# Patient Record
Sex: FEMALE | Race: WHITE | NOT HISPANIC OR LATINO | URBAN - METROPOLITAN AREA
[De-identification: names, ages, dates, MRNs, and addresses within clinical notes are randomized per-mention and may not be internally consistent; named-entity substitution may affect disease eponyms.]

---

## 2017-03-22 ENCOUNTER — RECORDS - HEALTHEAST (OUTPATIENT)
Dept: LAB | Facility: CLINIC | Age: 77
End: 2017-03-22

## 2017-03-22 LAB
CHOLEST SERPL-MCNC: 151 MG/DL
FASTING STATUS PATIENT QL REPORTED: YES
HDLC SERPL-MCNC: 40 MG/DL
LDLC SERPL CALC-MCNC: 92 MG/DL
TRIGL SERPL-MCNC: 94 MG/DL

## 2018-04-17 ENCOUNTER — RECORDS - HEALTHEAST (OUTPATIENT)
Dept: LAB | Facility: CLINIC | Age: 78
End: 2018-04-17

## 2018-04-17 LAB
ALBUMIN SERPL-MCNC: 3.9 G/DL (ref 3.5–5)
ALP SERPL-CCNC: 82 U/L (ref 45–120)
ALT SERPL W P-5'-P-CCNC: 16 U/L (ref 0–45)
ANION GAP SERPL CALCULATED.3IONS-SCNC: 6 MMOL/L (ref 5–18)
AST SERPL W P-5'-P-CCNC: 20 U/L (ref 0–40)
BILIRUB SERPL-MCNC: 0.5 MG/DL (ref 0–1)
BUN SERPL-MCNC: 19 MG/DL (ref 8–28)
CALCIUM SERPL-MCNC: 9.3 MG/DL (ref 8.5–10.5)
CHLORIDE BLD-SCNC: 110 MMOL/L (ref 98–107)
CHOLEST SERPL-MCNC: 139 MG/DL
CO2 SERPL-SCNC: 29 MMOL/L (ref 22–31)
CREAT SERPL-MCNC: 0.78 MG/DL (ref 0.6–1.1)
FASTING STATUS PATIENT QL REPORTED: YES
GFR SERPL CREATININE-BSD FRML MDRD: >60 ML/MIN/1.73M2
GLUCOSE BLD-MCNC: 113 MG/DL (ref 70–125)
HDLC SERPL-MCNC: 42 MG/DL
LDLC SERPL CALC-MCNC: 87 MG/DL
POTASSIUM BLD-SCNC: 3.9 MMOL/L (ref 3.5–5)
PROT SERPL-MCNC: 6.6 G/DL (ref 6–8)
SODIUM SERPL-SCNC: 145 MMOL/L (ref 136–145)
TRIGL SERPL-MCNC: 52 MG/DL

## 2018-06-12 ENCOUNTER — RECORDS - HEALTHEAST (OUTPATIENT)
Dept: LAB | Facility: CLINIC | Age: 78
End: 2018-06-12

## 2018-06-12 LAB
BASOPHILS # BLD AUTO: 0 THOU/UL (ref 0–0.2)
BASOPHILS NFR BLD AUTO: 0 % (ref 0–2)
EOSINOPHIL COUNT (ABSOLUTE): 0 THOU/UL (ref 0–0.4)
EOSINOPHIL NFR BLD AUTO: 1 % (ref 0–6)
ERYTHROCYTE [DISTWIDTH] IN BLOOD BY AUTOMATED COUNT: 13.3 % (ref 11–14.5)
FOLATE SERPL-MCNC: 13.8 NG/ML
HCT VFR BLD AUTO: 31.6 % (ref 35–47)
HGB BLD-MCNC: 10.6 G/DL (ref 12–16)
IRON SATN MFR SERPL: 27 % (ref 20–50)
IRON SERPL-MCNC: 76 UG/DL (ref 42–175)
LYMPHOCYTES # BLD AUTO: 3.1 THOU/UL (ref 0.8–4.4)
LYMPHOCYTES NFR BLD AUTO: 65 % (ref 20–40)
MCH RBC QN AUTO: 31.3 PG (ref 27–34)
MCHC RBC AUTO-ENTMCNC: 33.5 G/DL (ref 32–36)
MCV RBC AUTO: 93 FL (ref 80–100)
MONOCYTES # BLD AUTO: 0.2 THOU/UL (ref 0–0.9)
MONOCYTES NFR BLD AUTO: 4 % (ref 2–10)
PLAT MORPH BLD: ABNORMAL
PLATELET # BLD AUTO: 90 THOU/UL (ref 140–440)
PMV BLD AUTO: 10.6 FL (ref 8.5–12.5)
RBC # BLD AUTO: 3.39 MILL/UL (ref 3.8–5.4)
TIBC SERPL-MCNC: 281 UG/DL (ref 313–563)
TOTAL NEUTROPHILS-ABS(DIFF): 1.4 THOU/UL (ref 2–7.7)
TOTAL NEUTROPHILS-REL(DIFF): 30 % (ref 50–70)
TRANSFERRIN SERPL-MCNC: 225 MG/DL (ref 212–360)
TSH SERPL DL<=0.005 MIU/L-ACNC: 1.42 UIU/ML (ref 0.3–5)
VIT B12 SERPL-MCNC: 280 PG/ML (ref 213–816)
WBC: 4.7 THOU/UL (ref 4–11)

## 2018-07-10 ENCOUNTER — RECORDS - HEALTHEAST (OUTPATIENT)
Dept: LAB | Facility: CLINIC | Age: 78
End: 2018-07-10

## 2018-07-11 LAB
BASOPHILS # BLD AUTO: 0 THOU/UL (ref 0–0.2)
BASOPHILS NFR BLD AUTO: 0 % (ref 0–2)
EOSINOPHIL COUNT (ABSOLUTE): 0.1 THOU/UL (ref 0–0.4)
EOSINOPHIL NFR BLD AUTO: 1 % (ref 0–6)
ERYTHROCYTE [DISTWIDTH] IN BLOOD BY AUTOMATED COUNT: 13.2 % (ref 11–14.5)
HCT VFR BLD AUTO: 32.3 % (ref 35–47)
HGB BLD-MCNC: 11.2 G/DL (ref 12–16)
LYMPHOCYTES # BLD AUTO: 3.8 THOU/UL (ref 0.8–4.4)
LYMPHOCYTES NFR BLD AUTO: 64 % (ref 20–40)
MCH RBC QN AUTO: 31.5 PG (ref 27–34)
MCHC RBC AUTO-ENTMCNC: 34.7 G/DL (ref 32–36)
MCV RBC AUTO: 91 FL (ref 80–100)
MONOCYTES # BLD AUTO: 0.2 THOU/UL (ref 0–0.9)
MONOCYTES NFR BLD AUTO: 3 % (ref 2–10)
OVALOCYTES: ABNORMAL
PLAT MORPH BLD: ABNORMAL
PLATELET # BLD AUTO: 92 THOU/UL (ref 140–440)
PMV BLD AUTO: 10.4 FL (ref 8.5–12.5)
RBC # BLD AUTO: 3.55 MILL/UL (ref 3.8–5.4)
REACTIVE LYMPHS: ABNORMAL
TOTAL NEUTROPHILS-ABS(DIFF): 1.9 THOU/UL (ref 2–7.7)
TOTAL NEUTROPHILS-REL(DIFF): 32 % (ref 50–70)
WBC: 6 THOU/UL (ref 4–11)

## 2018-10-29 ENCOUNTER — HOSPITAL ENCOUNTER (OUTPATIENT)
Dept: MAMMOGRAPHY | Facility: CLINIC | Age: 78
Discharge: HOME | End: 2018-10-29
Attending: FAMILY MEDICINE

## 2018-10-29 DIAGNOSIS — Z12.31 VISIT FOR SCREENING MAMMOGRAM: ICD-10-CM

## 2019-03-22 ENCOUNTER — RECORDS - HEALTHEAST (OUTPATIENT)
Dept: LAB | Facility: CLINIC | Age: 79
End: 2019-03-22

## 2019-03-22 LAB
CHOLEST SERPL-MCNC: 155 MG/DL
FASTING STATUS PATIENT QL REPORTED: ABNORMAL
HDLC SERPL-MCNC: 45 MG/DL
LDLC SERPL CALC-MCNC: 90 MG/DL
TRIGL SERPL-MCNC: 98 MG/DL

## 2019-10-08 ENCOUNTER — RECORDS - HEALTHEAST (OUTPATIENT)
Dept: LAB | Facility: CLINIC | Age: 79
End: 2019-10-08

## 2019-10-08 LAB
ALBUMIN SERPL-MCNC: 4.3 G/DL (ref 3.5–5)
ALP SERPL-CCNC: 79 U/L (ref 45–120)
ALT SERPL W P-5'-P-CCNC: 19 U/L (ref 0–45)
ANION GAP SERPL CALCULATED.3IONS-SCNC: 9 MMOL/L (ref 5–18)
AST SERPL W P-5'-P-CCNC: 22 U/L (ref 0–40)
BILIRUB SERPL-MCNC: 0.5 MG/DL (ref 0–1)
BUN SERPL-MCNC: 17 MG/DL (ref 8–28)
CALCIUM SERPL-MCNC: 9.4 MG/DL (ref 8.5–10.5)
CHLORIDE BLD-SCNC: 108 MMOL/L (ref 98–107)
CO2 SERPL-SCNC: 25 MMOL/L (ref 22–31)
CREAT SERPL-MCNC: 0.83 MG/DL (ref 0.6–1.1)
GFR SERPL CREATININE-BSD FRML MDRD: >60 ML/MIN/1.73M2
GLUCOSE BLD-MCNC: 95 MG/DL (ref 70–125)
POTASSIUM BLD-SCNC: 4 MMOL/L (ref 3.5–5)
PROT SERPL-MCNC: 6.5 G/DL (ref 6–8)
SODIUM SERPL-SCNC: 142 MMOL/L (ref 136–145)

## 2020-01-22 ENCOUNTER — HOSPITAL ENCOUNTER (OUTPATIENT)
Dept: MAMMOGRAPHY | Facility: CLINIC | Age: 80
Discharge: HOME | End: 2020-01-22
Attending: FAMILY MEDICINE

## 2020-01-22 DIAGNOSIS — Z12.31 VISIT FOR SCREENING MAMMOGRAM: ICD-10-CM

## 2020-08-25 ENCOUNTER — RECORDS - HEALTHEAST (OUTPATIENT)
Dept: LAB | Facility: CLINIC | Age: 80
End: 2020-08-25

## 2020-08-25 LAB
ALBUMIN SERPL-MCNC: 4.3 G/DL (ref 3.5–5)
ALP SERPL-CCNC: 86 U/L (ref 45–120)
ALT SERPL W P-5'-P-CCNC: 15 U/L (ref 0–45)
ANION GAP SERPL CALCULATED.3IONS-SCNC: 9 MMOL/L (ref 5–18)
AST SERPL W P-5'-P-CCNC: 18 U/L (ref 0–40)
BILIRUB SERPL-MCNC: 0.6 MG/DL (ref 0–1)
BUN SERPL-MCNC: 18 MG/DL (ref 8–28)
CALCIUM SERPL-MCNC: 9.4 MG/DL (ref 8.5–10.5)
CHLORIDE BLD-SCNC: 107 MMOL/L (ref 98–107)
CHOLEST SERPL-MCNC: 147 MG/DL
CO2 SERPL-SCNC: 26 MMOL/L (ref 22–31)
CREAT SERPL-MCNC: 0.92 MG/DL (ref 0.6–1.1)
FASTING STATUS PATIENT QL REPORTED: ABNORMAL
GFR SERPL CREATININE-BSD FRML MDRD: 59 ML/MIN/1.73M2
GLUCOSE BLD-MCNC: 98 MG/DL (ref 70–125)
HDLC SERPL-MCNC: 38 MG/DL
LDLC SERPL CALC-MCNC: 87 MG/DL
POTASSIUM BLD-SCNC: 4 MMOL/L (ref 3.5–5)
PROT SERPL-MCNC: 6.5 G/DL (ref 6–8)
SODIUM SERPL-SCNC: 142 MMOL/L (ref 136–145)
TRIGL SERPL-MCNC: 112 MG/DL
TSH SERPL DL<=0.005 MIU/L-ACNC: 1.95 UIU/ML (ref 0.3–5)
VIT B12 SERPL-MCNC: 347 PG/ML (ref 213–816)

## 2021-05-25 ENCOUNTER — RECORDS - HEALTHEAST (OUTPATIENT)
Dept: ADMINISTRATIVE | Facility: CLINIC | Age: 81
End: 2021-05-25

## 2021-05-26 ENCOUNTER — RECORDS - HEALTHEAST (OUTPATIENT)
Dept: ADMINISTRATIVE | Facility: CLINIC | Age: 81
End: 2021-05-26

## 2021-05-27 ENCOUNTER — RECORDS - HEALTHEAST (OUTPATIENT)
Dept: ADMINISTRATIVE | Facility: CLINIC | Age: 81
End: 2021-05-27

## 2021-05-28 ENCOUNTER — RECORDS - HEALTHEAST (OUTPATIENT)
Dept: ADMINISTRATIVE | Facility: CLINIC | Age: 81
End: 2021-05-28

## 2021-07-13 ENCOUNTER — RECORDS - HEALTHEAST (OUTPATIENT)
Dept: ADMINISTRATIVE | Facility: CLINIC | Age: 81
End: 2021-07-13

## 2021-07-21 ENCOUNTER — RECORDS - HEALTHEAST (OUTPATIENT)
Dept: ADMINISTRATIVE | Facility: CLINIC | Age: 81
End: 2021-07-21

## 2021-10-22 ENCOUNTER — LAB REQUISITION (OUTPATIENT)
Dept: LAB | Facility: CLINIC | Age: 81
End: 2021-10-22

## 2021-10-22 DIAGNOSIS — E78.5 HYPERLIPIDEMIA, UNSPECIFIED: ICD-10-CM

## 2021-10-22 LAB
ALBUMIN SERPL-MCNC: 4.4 G/DL (ref 3.5–5)
ALP SERPL-CCNC: 83 U/L (ref 45–120)
ALT SERPL W P-5'-P-CCNC: 13 U/L (ref 0–45)
ANION GAP SERPL CALCULATED.3IONS-SCNC: 9 MMOL/L (ref 5–18)
AST SERPL W P-5'-P-CCNC: 17 U/L (ref 0–40)
BILIRUB SERPL-MCNC: 0.5 MG/DL (ref 0–1)
BUN SERPL-MCNC: 16 MG/DL (ref 8–28)
CALCIUM SERPL-MCNC: 9.4 MG/DL (ref 8.5–10.5)
CHLORIDE BLD-SCNC: 110 MMOL/L (ref 98–107)
CHOLEST SERPL-MCNC: 147 MG/DL
CO2 SERPL-SCNC: 26 MMOL/L (ref 22–31)
CREAT SERPL-MCNC: 0.81 MG/DL (ref 0.6–1.1)
GFR SERPL CREATININE-BSD FRML MDRD: 69 ML/MIN/1.73M2
GLUCOSE BLD-MCNC: 103 MG/DL (ref 70–125)
HDLC SERPL-MCNC: 46 MG/DL
LDLC SERPL CALC-MCNC: 86 MG/DL
POTASSIUM BLD-SCNC: 3.9 MMOL/L (ref 3.5–5)
PROT SERPL-MCNC: 6.6 G/DL (ref 6–8)
SODIUM SERPL-SCNC: 145 MMOL/L (ref 136–145)
TRIGL SERPL-MCNC: 77 MG/DL

## 2021-10-22 PROCEDURE — 80053 COMPREHEN METABOLIC PANEL: CPT | Performed by: FAMILY MEDICINE

## 2021-10-22 PROCEDURE — 80061 LIPID PANEL: CPT | Performed by: FAMILY MEDICINE

## 2022-09-08 ENCOUNTER — LAB REQUISITION (OUTPATIENT)
Dept: LAB | Facility: CLINIC | Age: 82
End: 2022-09-08

## 2022-09-08 DIAGNOSIS — E11.9 TYPE 2 DIABETES MELLITUS WITHOUT COMPLICATIONS (H): ICD-10-CM

## 2022-09-08 DIAGNOSIS — D64.9 ANEMIA, UNSPECIFIED: ICD-10-CM

## 2022-09-08 LAB
ERYTHROCYTE [DISTWIDTH] IN BLOOD BY AUTOMATED COUNT: 13.8 % (ref 10–15)
HCT VFR BLD AUTO: 29.6 % (ref 35–47)
HGB BLD-MCNC: 9.9 G/DL (ref 11.7–15.7)
MCH RBC QN AUTO: 31.1 PG (ref 26.5–33)
MCHC RBC AUTO-ENTMCNC: 33.4 G/DL (ref 31.5–36.5)
MCV RBC AUTO: 93 FL (ref 78–100)
PLATELET # BLD AUTO: 81 10E3/UL (ref 150–450)
RBC # BLD AUTO: 3.18 10E6/UL (ref 3.8–5.2)
WBC # BLD AUTO: 5.7 10E3/UL (ref 4–11)

## 2022-09-08 PROCEDURE — 80053 COMPREHEN METABOLIC PANEL: CPT | Performed by: FAMILY MEDICINE

## 2022-09-08 PROCEDURE — 82040 ASSAY OF SERUM ALBUMIN: CPT | Performed by: FAMILY MEDICINE

## 2022-09-08 PROCEDURE — 85027 COMPLETE CBC AUTOMATED: CPT | Performed by: FAMILY MEDICINE

## 2022-09-08 PROCEDURE — 83036 HEMOGLOBIN GLYCOSYLATED A1C: CPT | Performed by: FAMILY MEDICINE

## 2022-09-08 PROCEDURE — 80061 LIPID PANEL: CPT | Performed by: FAMILY MEDICINE

## 2022-09-09 LAB
ALBUMIN SERPL BCG-MCNC: 4.5 G/DL (ref 3.5–5.2)
ALP SERPL-CCNC: 72 U/L (ref 35–104)
ALT SERPL W P-5'-P-CCNC: 18 U/L (ref 10–35)
ANION GAP SERPL CALCULATED.3IONS-SCNC: 10 MMOL/L (ref 7–15)
AST SERPL W P-5'-P-CCNC: 21 U/L (ref 10–35)
BILIRUB SERPL-MCNC: 0.4 MG/DL
BUN SERPL-MCNC: 19.6 MG/DL (ref 8–23)
CALCIUM SERPL-MCNC: 9.3 MG/DL (ref 8.8–10.2)
CHLORIDE SERPL-SCNC: 109 MMOL/L (ref 98–107)
CHOLEST SERPL-MCNC: 137 MG/DL
CREAT SERPL-MCNC: 0.96 MG/DL (ref 0.51–0.95)
DEPRECATED HCO3 PLAS-SCNC: 24 MMOL/L (ref 22–29)
GFR SERPL CREATININE-BSD FRML MDRD: 60 ML/MIN/1.73M2
GLUCOSE SERPL-MCNC: 89 MG/DL (ref 70–99)
HBA1C MFR BLD: 5.4 %
HDLC SERPL-MCNC: 42 MG/DL
LDLC SERPL CALC-MCNC: 74 MG/DL
NONHDLC SERPL-MCNC: 95 MG/DL
POTASSIUM SERPL-SCNC: 3.9 MMOL/L (ref 3.4–5.3)
PROT SERPL-MCNC: 6.3 G/DL (ref 6.4–8.3)
SODIUM SERPL-SCNC: 143 MMOL/L (ref 136–145)
TRIGL SERPL-MCNC: 107 MG/DL

## 2023-01-06 ENCOUNTER — LAB REQUISITION (OUTPATIENT)
Dept: LAB | Facility: CLINIC | Age: 83
End: 2023-01-06

## 2023-01-06 DIAGNOSIS — R30.0 DYSURIA: ICD-10-CM

## 2023-01-06 PROCEDURE — 87086 URINE CULTURE/COLONY COUNT: CPT | Performed by: FAMILY MEDICINE

## 2023-01-08 LAB — BACTERIA UR CULT: NORMAL

## 2023-04-20 ENCOUNTER — LAB REQUISITION (OUTPATIENT)
Dept: LAB | Facility: CLINIC | Age: 83
End: 2023-04-20

## 2023-04-20 DIAGNOSIS — Z00.00 ENCOUNTER FOR GENERAL ADULT MEDICAL EXAMINATION WITHOUT ABNORMAL FINDINGS: ICD-10-CM

## 2023-04-20 LAB
ALBUMIN SERPL BCG-MCNC: 4.6 G/DL (ref 3.5–5.2)
ALP SERPL-CCNC: 73 U/L (ref 35–104)
ALT SERPL W P-5'-P-CCNC: 20 U/L (ref 10–35)
ANION GAP SERPL CALCULATED.3IONS-SCNC: 11 MMOL/L (ref 7–15)
AST SERPL W P-5'-P-CCNC: 24 U/L (ref 10–35)
BILIRUB SERPL-MCNC: 0.5 MG/DL
BUN SERPL-MCNC: 16.9 MG/DL (ref 8–23)
CALCIUM SERPL-MCNC: 9.5 MG/DL (ref 8.8–10.2)
CHLORIDE SERPL-SCNC: 103 MMOL/L (ref 98–107)
CREAT SERPL-MCNC: 0.98 MG/DL (ref 0.51–0.95)
DEPRECATED HCO3 PLAS-SCNC: 23 MMOL/L (ref 22–29)
GFR SERPL CREATININE-BSD FRML MDRD: 58 ML/MIN/1.73M2
GLUCOSE SERPL-MCNC: 101 MG/DL (ref 70–99)
POTASSIUM SERPL-SCNC: 4.3 MMOL/L (ref 3.4–5.3)
PROT SERPL-MCNC: 6.4 G/DL (ref 6.4–8.3)
SODIUM SERPL-SCNC: 137 MMOL/L (ref 136–145)

## 2023-04-20 PROCEDURE — 80053 COMPREHEN METABOLIC PANEL: CPT | Performed by: PHYSICIAN ASSISTANT

## 2023-07-28 ENCOUNTER — LAB REQUISITION (OUTPATIENT)
Dept: LAB | Facility: CLINIC | Age: 83
End: 2023-07-28

## 2023-07-28 DIAGNOSIS — R30.0 DYSURIA: ICD-10-CM

## 2023-07-28 PROCEDURE — 87086 URINE CULTURE/COLONY COUNT: CPT | Performed by: PHYSICIAN ASSISTANT

## 2023-07-30 LAB — BACTERIA UR CULT: NORMAL

## 2024-01-18 ENCOUNTER — LAB REQUISITION (OUTPATIENT)
Dept: LAB | Facility: CLINIC | Age: 84
End: 2024-01-18

## 2024-01-18 DIAGNOSIS — E78.5 HYPERLIPIDEMIA, UNSPECIFIED: ICD-10-CM

## 2024-01-18 DIAGNOSIS — E11.9 TYPE 2 DIABETES MELLITUS WITHOUT COMPLICATIONS (H): ICD-10-CM

## 2024-01-18 PROCEDURE — 80053 COMPREHEN METABOLIC PANEL: CPT | Performed by: PHYSICIAN ASSISTANT

## 2024-01-18 PROCEDURE — 80061 LIPID PANEL: CPT | Performed by: PHYSICIAN ASSISTANT

## 2024-01-19 LAB
ALBUMIN SERPL BCG-MCNC: 4.1 G/DL (ref 3.5–5.2)
ALP SERPL-CCNC: 80 U/L (ref 40–150)
ALT SERPL W P-5'-P-CCNC: 20 U/L (ref 0–50)
ANION GAP SERPL CALCULATED.3IONS-SCNC: 12 MMOL/L (ref 7–15)
AST SERPL W P-5'-P-CCNC: 28 U/L (ref 0–45)
BILIRUB SERPL-MCNC: 0.4 MG/DL
BUN SERPL-MCNC: 19.3 MG/DL (ref 8–23)
CALCIUM SERPL-MCNC: 9.3 MG/DL (ref 8.8–10.2)
CHLORIDE SERPL-SCNC: 105 MMOL/L (ref 98–107)
CHOLEST SERPL-MCNC: 138 MG/DL
CREAT SERPL-MCNC: 1.1 MG/DL (ref 0.51–0.95)
DEPRECATED HCO3 PLAS-SCNC: 25 MMOL/L (ref 22–29)
EGFRCR SERPLBLD CKD-EPI 2021: 50 ML/MIN/1.73M2
FASTING STATUS PATIENT QL REPORTED: ABNORMAL
GLUCOSE SERPL-MCNC: 91 MG/DL (ref 70–99)
HDLC SERPL-MCNC: 43 MG/DL
LDLC SERPL CALC-MCNC: 70 MG/DL
NONHDLC SERPL-MCNC: 95 MG/DL
POTASSIUM SERPL-SCNC: 4.3 MMOL/L (ref 3.4–5.3)
PROT SERPL-MCNC: 6.8 G/DL (ref 6.4–8.3)
SODIUM SERPL-SCNC: 142 MMOL/L (ref 135–145)
TRIGL SERPL-MCNC: 125 MG/DL

## 2024-11-01 ENCOUNTER — LAB REQUISITION (OUTPATIENT)
Dept: LAB | Facility: CLINIC | Age: 84
End: 2024-11-01

## 2024-11-01 DIAGNOSIS — E11.22 TYPE 2 DIABETES MELLITUS WITH DIABETIC CHRONIC KIDNEY DISEASE (H): ICD-10-CM

## 2024-11-01 PROCEDURE — 82043 UR ALBUMIN QUANTITATIVE: CPT | Performed by: PHYSICIAN ASSISTANT

## 2024-11-01 PROCEDURE — 82465 ASSAY BLD/SERUM CHOLESTEROL: CPT | Performed by: PHYSICIAN ASSISTANT

## 2024-11-01 PROCEDURE — 82310 ASSAY OF CALCIUM: CPT | Performed by: PHYSICIAN ASSISTANT

## 2024-11-02 LAB
ALBUMIN SERPL BCG-MCNC: 4.2 G/DL (ref 3.5–5.2)
ALP SERPL-CCNC: 79 U/L (ref 40–150)
ALT SERPL W P-5'-P-CCNC: 41 U/L (ref 0–50)
ANION GAP SERPL CALCULATED.3IONS-SCNC: 10 MMOL/L (ref 7–15)
AST SERPL W P-5'-P-CCNC: 34 U/L (ref 0–45)
BILIRUB SERPL-MCNC: 0.9 MG/DL
BUN SERPL-MCNC: 15.7 MG/DL (ref 8–23)
CALCIUM SERPL-MCNC: 9.2 MG/DL (ref 8.8–10.4)
CHLORIDE SERPL-SCNC: 104 MMOL/L (ref 98–107)
CHOLEST SERPL-MCNC: 124 MG/DL
CREAT SERPL-MCNC: 0.97 MG/DL (ref 0.51–0.95)
CREAT UR-MCNC: 280 MG/DL
EGFRCR SERPLBLD CKD-EPI 2021: 58 ML/MIN/1.73M2
FASTING STATUS PATIENT QL REPORTED: ABNORMAL
FASTING STATUS PATIENT QL REPORTED: NORMAL
GLUCOSE SERPL-MCNC: 123 MG/DL (ref 70–99)
HCO3 SERPL-SCNC: 27 MMOL/L (ref 22–29)
HDLC SERPL-MCNC: 50 MG/DL
LDLC SERPL CALC-MCNC: 58 MG/DL
MICROALBUMIN UR-MCNC: 124 MG/L
MICROALBUMIN/CREAT UR: 44.29 MG/G CR (ref 0–25)
NONHDLC SERPL-MCNC: 74 MG/DL
POTASSIUM SERPL-SCNC: 3.7 MMOL/L (ref 3.4–5.3)
PROT SERPL-MCNC: 6.3 G/DL (ref 6.4–8.3)
SODIUM SERPL-SCNC: 141 MMOL/L (ref 135–145)
TRIGL SERPL-MCNC: 80 MG/DL

## 2025-01-19 ENCOUNTER — APPOINTMENT (OUTPATIENT)
Dept: RADIOLOGY | Facility: HOSPITAL | Age: 85
End: 2025-01-19
Attending: STUDENT IN AN ORGANIZED HEALTH CARE EDUCATION/TRAINING PROGRAM
Payer: COMMERCIAL

## 2025-01-19 ENCOUNTER — APPOINTMENT (OUTPATIENT)
Dept: CT IMAGING | Facility: HOSPITAL | Age: 85
End: 2025-01-19
Attending: INTERNAL MEDICINE
Payer: COMMERCIAL

## 2025-01-19 ENCOUNTER — HOSPITAL ENCOUNTER (OUTPATIENT)
Facility: HOSPITAL | Age: 85
Setting detail: OBSERVATION
Discharge: NURSING, REHAB OR LONG-TERM CARE | End: 2025-01-20
Attending: STUDENT IN AN ORGANIZED HEALTH CARE EDUCATION/TRAINING PROGRAM | Admitting: STUDENT IN AN ORGANIZED HEALTH CARE EDUCATION/TRAINING PROGRAM
Payer: COMMERCIAL

## 2025-01-19 ENCOUNTER — APPOINTMENT (OUTPATIENT)
Dept: PHYSICAL THERAPY | Facility: HOSPITAL | Age: 85
End: 2025-01-19
Attending: INTERNAL MEDICINE
Payer: COMMERCIAL

## 2025-01-19 ENCOUNTER — APPOINTMENT (OUTPATIENT)
Dept: CT IMAGING | Facility: HOSPITAL | Age: 85
End: 2025-01-19
Attending: STUDENT IN AN ORGANIZED HEALTH CARE EDUCATION/TRAINING PROGRAM
Payer: COMMERCIAL
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
## 2025-01-19 DIAGNOSIS — K59.03 DRUG-INDUCED CONSTIPATION: Primary | ICD-10-CM
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
## 2025-01-19 DIAGNOSIS — S42.201A CLOSED FRACTURE OF PROXIMAL END OF RIGHT HUMERUS, UNSPECIFIED FRACTURE MORPHOLOGY, INITIAL ENCOUNTER: ICD-10-CM

## 2025-01-19 DIAGNOSIS — E04.1 THYROID NODULE: ICD-10-CM

## 2025-01-19 LAB
ANION GAP SERPL CALCULATED.3IONS-SCNC: 13 MMOL/L (ref 7–15)
BASOPHILS # BLD AUTO: 0 10E3/UL (ref 0–0.2)
BASOPHILS NFR BLD AUTO: 0 %
BUN SERPL-MCNC: 19.2 MG/DL (ref 8–23)
CALCIUM SERPL-MCNC: 9.7 MG/DL (ref 8.8–10.4)
CHLORIDE SERPL-SCNC: 104 MMOL/L (ref 98–107)
CK SERPL-CCNC: 89 U/L (ref 26–192)
CREAT SERPL-MCNC: 0.78 MG/DL (ref 0.51–0.95)
EGFRCR SERPLBLD CKD-EPI 2021: 75 ML/MIN/1.73M2
EOSINOPHIL # BLD AUTO: 0 10E3/UL (ref 0–0.7)
EOSINOPHIL NFR BLD AUTO: 0 %
ERYTHROCYTE [DISTWIDTH] IN BLOOD BY AUTOMATED COUNT: 12.5 % (ref 10–15)
GLUCOSE SERPL-MCNC: 154 MG/DL (ref 70–99)
HCO3 SERPL-SCNC: 25 MMOL/L (ref 22–29)
HCT VFR BLD AUTO: 29.9 % (ref 35–47)
HGB BLD-MCNC: 10.1 G/DL (ref 11.7–15.7)
IMM GRANULOCYTES # BLD: 0 10E3/UL
IMM GRANULOCYTES NFR BLD: 0 %
LYMPHOCYTES # BLD AUTO: 2.9 10E3/UL (ref 0.8–5.3)
LYMPHOCYTES NFR BLD AUTO: 33 %
MCH RBC QN AUTO: 32.2 PG (ref 26.5–33)
MCHC RBC AUTO-ENTMCNC: 33.8 G/DL (ref 31.5–36.5)
MCV RBC AUTO: 95 FL (ref 78–100)
MONOCYTES # BLD AUTO: 0.6 10E3/UL (ref 0–1.3)
MONOCYTES NFR BLD AUTO: 7 %
NEUTROPHILS # BLD AUTO: 5.1 10E3/UL (ref 1.6–8.3)
NEUTROPHILS NFR BLD AUTO: 59 %
NRBC # BLD AUTO: 0 10E3/UL
NRBC BLD AUTO-RTO: 0 /100
PLATELET # BLD AUTO: 82 10E3/UL (ref 150–450)
POTASSIUM SERPL-SCNC: 3.5 MMOL/L (ref 3.4–5.3)
RBC # BLD AUTO: 3.14 10E6/UL (ref 3.8–5.2)
SODIUM SERPL-SCNC: 142 MMOL/L (ref 135–145)
WBC # BLD AUTO: 8.7 10E3/UL (ref 4–11)

## 2025-01-19 PROCEDURE — 70450 CT HEAD/BRAIN W/O DYE: CPT

## 2025-01-19 PROCEDURE — 82550 ASSAY OF CK (CPK): CPT | Performed by: INTERNAL MEDICINE

## 2025-01-19 PROCEDURE — 120N000001 HC R&B MED SURG/OB

## 2025-01-19 PROCEDURE — 250N000013 HC RX MED GY IP 250 OP 250 PS 637: Performed by: INTERNAL MEDICINE

## 2025-01-19 PROCEDURE — 82565 ASSAY OF CREATININE: CPT | Performed by: STUDENT IN AN ORGANIZED HEALTH CARE EDUCATION/TRAINING PROGRAM

## 2025-01-19 PROCEDURE — 85041 AUTOMATED RBC COUNT: CPT | Performed by: STUDENT IN AN ORGANIZED HEALTH CARE EDUCATION/TRAINING PROGRAM

## 2025-01-19 PROCEDURE — 97116 GAIT TRAINING THERAPY: CPT | Mod: GP

## 2025-01-19 PROCEDURE — 36415 COLL VENOUS BLD VENIPUNCTURE: CPT | Performed by: STUDENT IN AN ORGANIZED HEALTH CARE EDUCATION/TRAINING PROGRAM

## 2025-01-19 PROCEDURE — 97161 PT EVAL LOW COMPLEX 20 MIN: CPT | Mod: GP

## 2025-01-19 PROCEDURE — 85004 AUTOMATED DIFF WBC COUNT: CPT | Performed by: STUDENT IN AN ORGANIZED HEALTH CARE EDUCATION/TRAINING PROGRAM

## 2025-01-19 PROCEDURE — 73200 CT UPPER EXTREMITY W/O DYE: CPT | Mod: RT

## 2025-01-19 PROCEDURE — 99223 1ST HOSP IP/OBS HIGH 75: CPT | Performed by: INTERNAL MEDICINE

## 2025-01-19 PROCEDURE — 80048 BASIC METABOLIC PNL TOTAL CA: CPT | Performed by: STUDENT IN AN ORGANIZED HEALTH CARE EDUCATION/TRAINING PROGRAM

## 2025-01-19 PROCEDURE — 73030 X-RAY EXAM OF SHOULDER: CPT | Mod: RT

## 2025-01-19 PROCEDURE — 72125 CT NECK SPINE W/O DYE: CPT

## 2025-01-19 PROCEDURE — 99285 EMERGENCY DEPT VISIT HI MDM: CPT | Mod: 25

## 2025-01-19 PROCEDURE — 250N000013 HC RX MED GY IP 250 OP 250 PS 637: Performed by: STUDENT IN AN ORGANIZED HEALTH CARE EDUCATION/TRAINING PROGRAM

## 2025-01-19 PROCEDURE — 73060 X-RAY EXAM OF HUMERUS: CPT | Mod: RT

## 2025-01-19 RX ORDER — TOLTERODINE 2 MG/1
4 CAPSULE, EXTENDED RELEASE ORAL DAILY
Status: DISCONTINUED | OUTPATIENT
Start: 2025-01-19 | End: 2025-01-20 | Stop reason: HOSPADM

## 2025-01-19 RX ORDER — DONEPEZIL HYDROCHLORIDE 23 MG/1
23 TABLET, FILM COATED ORAL DAILY
Status: DISCONTINUED | OUTPATIENT
Start: 2025-01-19 | End: 2025-01-19

## 2025-01-19 RX ORDER — DONEPEZIL HYDROCHLORIDE 23 MG/1
23 TABLET, FILM COATED ORAL AT BEDTIME
Status: DISCONTINUED | OUTPATIENT
Start: 2025-01-19 | End: 2025-01-20 | Stop reason: HOSPADM

## 2025-01-19 RX ORDER — ACETAMINOPHEN 325 MG/1
650 TABLET ORAL EVERY 4 HOURS PRN
Status: DISCONTINUED | OUTPATIENT
Start: 2025-01-19 | End: 2025-01-20 | Stop reason: HOSPADM

## 2025-01-19 RX ORDER — MULTIPLE VITAMINS W/ MINERALS TAB 9MG-400MCG
1 TAB ORAL DAILY
COMMUNITY

## 2025-01-19 RX ORDER — ATORVASTATIN CALCIUM 20 MG/1
20 TABLET, FILM COATED ORAL EVERY EVENING
COMMUNITY

## 2025-01-19 RX ORDER — ONDANSETRON 4 MG/1
4 TABLET, ORALLY DISINTEGRATING ORAL EVERY 6 HOURS PRN
Status: DISCONTINUED | OUTPATIENT
Start: 2025-01-19 | End: 2025-01-20 | Stop reason: HOSPADM

## 2025-01-19 RX ORDER — ATORVASTATIN CALCIUM 10 MG/1
20 TABLET, FILM COATED ORAL EVERY EVENING
Status: DISCONTINUED | OUTPATIENT
Start: 2025-01-19 | End: 2025-01-20 | Stop reason: HOSPADM

## 2025-01-19 RX ORDER — NALOXONE HYDROCHLORIDE 0.4 MG/ML
0.4 INJECTION, SOLUTION INTRAMUSCULAR; INTRAVENOUS; SUBCUTANEOUS
Status: DISCONTINUED | OUTPATIENT
Start: 2025-01-19 | End: 2025-01-20 | Stop reason: HOSPADM

## 2025-01-19 RX ORDER — ESTRADIOL 0.1 MG/G
2 CREAM VAGINAL
Status: DISCONTINUED | OUTPATIENT
Start: 2025-01-20 | End: 2025-01-20 | Stop reason: HOSPADM

## 2025-01-19 RX ORDER — NALOXONE HYDROCHLORIDE 0.4 MG/ML
0.2 INJECTION, SOLUTION INTRAMUSCULAR; INTRAVENOUS; SUBCUTANEOUS
Status: DISCONTINUED | OUTPATIENT
Start: 2025-01-19 | End: 2025-01-20 | Stop reason: HOSPADM

## 2025-01-19 RX ORDER — TOLTERODINE 4 MG/1
4 CAPSULE, EXTENDED RELEASE ORAL DAILY
COMMUNITY

## 2025-01-19 RX ORDER — LIDOCAINE 40 MG/G
CREAM TOPICAL
Status: DISCONTINUED | OUTPATIENT
Start: 2025-01-19 | End: 2025-01-20 | Stop reason: HOSPADM

## 2025-01-19 RX ORDER — ACETAMINOPHEN 650 MG/1
650 SUPPOSITORY RECTAL EVERY 4 HOURS PRN
Status: DISCONTINUED | OUTPATIENT
Start: 2025-01-19 | End: 2025-01-20 | Stop reason: HOSPADM

## 2025-01-19 RX ORDER — LORAZEPAM 0.5 MG/1
0.5 TABLET ORAL
COMMUNITY
End: 2025-01-22

## 2025-01-19 RX ORDER — AMOXICILLIN 250 MG
2 CAPSULE ORAL 2 TIMES DAILY PRN
Status: DISCONTINUED | OUTPATIENT
Start: 2025-01-19 | End: 2025-01-20 | Stop reason: HOSPADM

## 2025-01-19 RX ORDER — DONEPEZIL HYDROCHLORIDE 23 MG/1
1 TABLET, FILM COATED ORAL AT BEDTIME
COMMUNITY

## 2025-01-19 RX ORDER — ONDANSETRON 2 MG/ML
4 INJECTION INTRAMUSCULAR; INTRAVENOUS EVERY 6 HOURS PRN
Status: DISCONTINUED | OUTPATIENT
Start: 2025-01-19 | End: 2025-01-20 | Stop reason: HOSPADM

## 2025-01-19 RX ORDER — AMOXICILLIN 250 MG
1 CAPSULE ORAL 2 TIMES DAILY PRN
Status: DISCONTINUED | OUTPATIENT
Start: 2025-01-19 | End: 2025-01-20 | Stop reason: HOSPADM

## 2025-01-19 RX ORDER — LORATADINE 10 MG/1
10 TABLET ORAL DAILY
Status: DISCONTINUED | OUTPATIENT
Start: 2025-01-19 | End: 2025-01-20 | Stop reason: HOSPADM

## 2025-01-19 RX ORDER — ACETAMINOPHEN 325 MG/1
975 TABLET ORAL ONCE
Status: COMPLETED | OUTPATIENT
Start: 2025-01-19 | End: 2025-01-19

## 2025-01-19 RX ORDER — ESTRADIOL 0.1 MG/G
CREAM VAGINAL
COMMUNITY

## 2025-01-19 RX ORDER — IPRATROPIUM BROMIDE 21 UG/1
2 SPRAY, METERED NASAL 3 TIMES DAILY
COMMUNITY
Start: 2024-11-29

## 2025-01-19 RX ORDER — FLUOXETINE 40 MG/1
40 CAPSULE ORAL DAILY
COMMUNITY
Start: 2024-12-12

## 2025-01-19 RX ORDER — IPRATROPIUM BROMIDE 21 UG/1
2 SPRAY, METERED NASAL 3 TIMES DAILY
Status: DISCONTINUED | OUTPATIENT
Start: 2025-01-19 | End: 2025-01-20 | Stop reason: HOSPADM

## 2025-01-19 RX ORDER — CARBOXYMETHYLCELLULOSE SODIUM 5 MG/ML
1 SOLUTION/ DROPS OPHTHALMIC 4 TIMES DAILY PRN
Status: DISCONTINUED | OUTPATIENT
Start: 2025-01-19 | End: 2025-01-20 | Stop reason: HOSPADM

## 2025-01-19 RX ORDER — CALCIUM CARBONATE 500 MG/1
1000 TABLET, CHEWABLE ORAL 4 TIMES DAILY PRN
Status: DISCONTINUED | OUTPATIENT
Start: 2025-01-19 | End: 2025-01-20 | Stop reason: HOSPADM

## 2025-01-19 RX ORDER — IBUPROFEN 600 MG/1
600 TABLET, FILM COATED ORAL ONCE
Status: COMPLETED | OUTPATIENT
Start: 2025-01-19 | End: 2025-01-19

## 2025-01-19 RX ORDER — LORAZEPAM 0.5 MG/1
0.5 TABLET ORAL
Status: DISCONTINUED | OUTPATIENT
Start: 2025-01-19 | End: 2025-01-20 | Stop reason: HOSPADM

## 2025-01-19 RX ADMIN — DONEPEZIL HYDROCHLORIDE 23 MG: 23 TABLET, FILM COATED ORAL at 20:14

## 2025-01-19 RX ADMIN — TOLTERODINE 4 MG: 2 CAPSULE, EXTENDED RELEASE ORAL at 11:08

## 2025-01-19 RX ADMIN — FLUOXETINE HYDROCHLORIDE 40 MG: 20 CAPSULE ORAL at 11:07

## 2025-01-19 RX ADMIN — ATORVASTATIN CALCIUM 20 MG: 10 TABLET, FILM COATED ORAL at 20:13

## 2025-01-19 RX ADMIN — IPRATROPIUM BROMIDE 2 SPRAY: 21 SPRAY, METERED NASAL at 20:30

## 2025-01-19 RX ADMIN — OXYCODONE HYDROCHLORIDE 2.5 MG: 5 TABLET ORAL at 20:13

## 2025-01-19 RX ADMIN — IBUPROFEN 600 MG: 600 TABLET, FILM COATED ORAL at 05:04

## 2025-01-19 RX ADMIN — ACETAMINOPHEN 975 MG: 325 TABLET ORAL at 05:04

## 2025-01-19 ASSESSMENT — ACTIVITIES OF DAILY LIVING (ADL)
ADLS_ACUITY_SCORE: 41
DEPENDENT_IADLS:: CLEANING;LAUNDRY;SHOPPING;MONEY MANAGEMENT;TRANSPORTATION
ADLS_ACUITY_SCORE: 41

## 2025-01-19 ASSESSMENT — COLUMBIA-SUICIDE SEVERITY RATING SCALE - C-SSRS
6. HAVE YOU EVER DONE ANYTHING, STARTED TO DO ANYTHING, OR PREPARED TO DO ANYTHING TO END YOUR LIFE?: NO
1. IN THE PAST MONTH, HAVE YOU WISHED YOU WERE DEAD OR WISHED YOU COULD GO TO SLEEP AND NOT WAKE UP?: NO
2. HAVE YOU ACTUALLY HAD ANY THOUGHTS OF KILLING YOURSELF IN THE PAST MONTH?: NO

## 2025-01-19 NOTE — PLAN OF CARE
Goal Outcome Evaluation:      Plan of Care Reviewed With: patient, child          Outcome Evaluation: Likely will need TCU.

## 2025-01-19 NOTE — ED NOTES
Bed: JNED-05  Expected date: 1/19/25  Expected time: 4:11 AM  Means of arrival: Ambulance  Comments:  WBL- 84F fall, right shoulder pain, vss

## 2025-01-19 NOTE — H&P
[facility]    History and Physical - Hospitalist Service         Date of Admission:  2025  Bushra Mccoy is a 84 year old female with a pertinent history of Alzheimer's disease and overactive urinary bladder, who presents to this ED via ambulance for evaluation of right shoulder injury following a fall.       Assessment & Plan    Mechanical fall this morning, hit right side of shoulder/neck  Unsure if she had LOC, no chest pain prior, no neuro weakness  Check CT head and C-spine today-placed on cardiac monitoring, check EKG today  PT OT evaluation  Falls precaution  Analgesia started    Right humerus neck fracture  No neurovascular deficits on exam   Xrays show an apparent subtle linear lucency within the cortex of the lateral aspect of the right humeral neck and apparent foreshortening of the humeral neck,   CT shows impacted fracture involving the surgical neck of the proximal right humerus.   Orthopedics consult placed  N.p.o. 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    Full code per discussion patient at bedside  She is quite alert and oriented currently    Ambulatory prior to this admission, recently moved to independent living facility,   recently  Quite active exercise tolerance prior to this episode, denies any cardiac renal disease.    Diet:  Diet if okay by orthopedic    DVT Prophylaxis: Low Risk/Ambulatory with no VTE prophylaxis  indicated  Soliz Catheter: Not present  Lines: None     Cardiac Monitoring: None  Code Status:  Above    Clinically Significant Risk Factors Present on Admission                 # Thrombocytopenia: Lowest platelets = 82 in last 2 days, will monitor for bleeding        # Anemia: based on hgb <11                  Disposition Plan     Medically Ready for Discharge: Anticipated in 2-4 Days           Jayesh Marx MD  Hospitalist Service  [facility]  Securely message with [employer] (more info)  Text page via [employer] Paging/Directory     ______________________________________________________________________    Chief Complaint   Mechanical fall at home, right shoulder pain    History is obtained from the patient    History of Present Illness   Per patient, the patient got out of bed to go use the bathroom at ~10 PM last night because she felt like she was going to have an episode of urine incontinence. She fell and hit her right shoulder on the dresser. She is unsure if she hit her head, however denies chest pain, no SOB or diaphoresis, she also denies loss of consciousness. The patient crawled to her night stand. The patient was on the ground the whole time until the paramedics arrived at the scene. She is not on blood thinners.  Noted right shoulder pain and minimal neck pain post fall.  No upper or lower extremity weakness, no back pain, no PND orthopnea and no other cardiopulmonary symptoms    In the ED she was in stable condition, not in severe distress, /60, temperature 97.4, heart rate 80, respiration 18.  Subsequent imaging showed right humerus fracture, orthopedics was consulted, she is admitted for specialty evaluation    Past Medical History    No past medical history on file.    Past Surgical History   No past surgical history on file.    Prior to Admission Medications   None        Physical Exam   Vital Signs: Temp: 97.4  F (36.3  C) Temp src: Oral BP: (!) 148/60 Pulse: 67   Resp:  18 SpO2: 99 %      Weight: 110 lbs 0 oz    General Appearance: AAOx3, not in distress  Respiratory: Clear anteriorly  Cardiovascular: S1-S2 only  GI: Nondistended, soft and nontender  Skin: No erythema  Other: Range of motion head and neck satisfactory, minimal pain on neck exam, no neurovascular weakness upper or lower extremities noted, tenderness noted right shoulder    Medical Decision Making       7 5 MINUTES SPENT BY ME on the date of service doing chart review, history, exam, documentation & further activities per the note.      Data   Imaging results reviewed over the past 24 hrs:   Recent Results (from the past 24 hours)   Humerus XR, G/E 2 views, right    Narrative    EXAM: RIGHT SHOULDER AND HUMERUS 4 VIEWS  LOCATION: [facility]  DATE/TIME: 01/19/2025, 5:40 AM CST    INDICATION: Fall. Pain.  COMPARISON: None.      Impression    IMPRESSION:   1.  An apparent subtle linear lucency within the cortex of the lateral aspect of the right humeral neck and apparent foreshortening of the humeral neck, suspicious for a mildly impacted fracture that could be acute or subacute. If clinically relevant, a   CT or MR of the shoulder could be considered for confirmation of a recent fracture.  2.  No other findings suspicious for acute fracture or malalignment of the right shoulder or humerus.     XR Shoulder Right 2 Views    Narrative    EXAM: RIGHT SHOULDER AND HUMERUS 4 VIEWS  LOCATION: [facility]  DATE/TIME: 01/19/2025, 5:40 AM CST    INDICATION: Fall. Pain.  COMPARISON: None.      Impression    IMPRESSION:   1.  An apparent subtle linear lucency within the cortex of the lateral aspect of the right humeral neck and apparent foreshortening of the humeral neck, suspicious for a mildly impacted fracture that could be acute or subacute. If clinically relevant, a   CT or MR of the shoulder could be considered for confirmation of a recent fracture.  2.  No other findings  suspicious for acute fracture or malalignment of the right shoulder or humerus.     CT Shoulder Right w/o Contrast    Narrative    EXAM: CT SHOULDER RIGHT WITHOUT CONTRAST  LOCATION: [facility]  DATE: 01/19/2025    INDICATION: Pain. Fall with injury. Lucency seen on XR, needing CT for further clarification of possibly fracture.  COMPARISON:   1. X-ray right humerus 2 views 01/19/2025 at 0523 hours.  2. X-ray right shoulder 2 views 01/19/2025 at 0524 hours.  TECHNIQUE: Noncontrast. Axial, sagittal and coronal thin-section reconstruction. Dose reduction techniques were used.     FINDINGS:     BONES:  -Impacted fracture involving the surgical neck of the proximal right humerus. Subtle sclerosis along the fracture lines suggests a subacute fracture. Demineralization of the visualized bones. Mild degenerative changes right shoulder. Distal right rotator   cuff calcific arthropathy.    SOFT TISSUES:  -Mild soft tissue swelling and bruising in the anterior subcutaneous fat of the right upper extremity extending inferiorly. No discrete soft tissue hematoma or fluid collection. Trace scarring in the right lung apex. Right thyroid lobe hypodense nodule   can be better evaluated with dedicated ultrasound.      Impression    IMPRESSION:  1.  Impacted fracture involving the surgical neck of the proximal right humerus. Subtle sclerosis along the fracture lines suggests a subacute fracture. Degenerative changes right shoulder. Distal right rotator cuff calcific arthropathy.    2.  Superficial soft tissue swelling and bruising anteriorly.    3.  Right thyroid lobe hypodense nodule can be better evaluated with dedicated ultrasound.

## 2025-01-19 NOTE — ED PROVIDER NOTES
EMERGENCY DEPARTMENT ENCOUNTER      NAME: Bushra Mccoy  AGE: 84 year old female  YOB: 1940  MRN: [id number]  EVALUATION DATE & TIME: 1/19/2025  4:16 AM    PCP: Shashank Alexandre    ED PROVIDER: Glen Hutchinson MD      Chief Complaint   Patient presents with    Shoulder Injury         FINAL IMPRESSION:  1. Closed fracture of proximal end of right humerus, unspecified fracture morphology, initial encounter    2. Thyroid nodule    3. Fall at home, initial encounter          ED COURSE & MEDICAL DECISION MAKING:    Pertinent Labs & Imaging studies reviewed. (See chart for details)  84 year old female presents to the Emergency Department for evaluation of R shoulder injury    ED Course as of 01/19/25 0652   Sun Jan 19, 2025   0418 I met with the patient, obtained history, performed an initial exam, and discussed options and plan for diagnostics and treatment here in the ED.   0507 Patient is an 84-year-old female who presents to the emergency department with right shoulder pain and fall.  She was trying to get out of bed tonight to get to the bathroom, but fell onto her right shoulder, and was unable to get up for several hours before she could call for help.  Paramedics arrived and they were able to assist her with walking to the cot.  She denies pain elsewhere, denies hitting her head, denies neck pain, and she is not on blood thinners.  No loss of consciousness or preceding symptoms to suggest syncope.  Primary and secondary trauma survey is only positive for bruising and tenderness to proximal right humerus.  Normal distal pulses of the right upper extremity.  Otherwise no abnormalities.  Plan for Tylenol, ibuprofen, and plain films of the right shoulder and humerus and reassessment   0557 Xrays show an apparent subtle linear lucency within the cortex of the lateral aspect of the right humeral neck and apparent foreshortening of the humeral neck, suspicious for a mildly impacted fracture that  could be acute or subacute. Ordered CT for further evaluation.   0636 CT shows impacted fracture involving the surgical neck of the proximal right humerus. Will discuss with orthopedics, as she likely will not be able to go back to her independent living in her current state and will require admission to the hospital.   0640 Rechecked and updated patient on her imaging results. Discussed further plan of care.   0647 Orthopedics has no recommendations at this time, but will see the patient while admitted and provide recommendations at that time.     Patient admitted to hospitalist service to look for TCU.    Medical Decision Making  Obtained supplemental history:Supplemental history obtained?: EMS  Reviewed external records: External records reviewed?: Other: Documented in chart, if applicable  Care impacted by chronic illness:Documented in Chart  Care significantly affected by social determinants of health:N/A  Did you consider but not order tests?: Work up considered but not performed and documented in chart, if applicable  Did you interpret images independently?: Independent interpretation of ECG and images noted in documentation, when applicable.  Consultation discussion with other provider:Did you involve another provider (consultant, , pharmacy, etc.)?: I discussed the care with another health care provider, see documentation for details.  Admit.  Not Applicable      At the conclusion of the encounter I discussed the results of all of the tests and the disposition. The questions were answered. The patient or family acknowledged understanding and was agreeable with the care plan.     0 minutes of critical care time     MEDICATIONS GIVEN IN THE EMERGENCY:  Medications   acetaminophen (TYLENOL) tablet 975 mg (975 mg Oral $Given 1/19/25 0504)   ibuprofen (ADVIL/MOTRIN) tablet 600 mg (600 mg Oral $Given 1/19/25 0504)       NEW PRESCRIPTIONS STARTED AT TODAY'S ER VISIT  New Prescriptions    No medications on file  "         =================================================================    HPI    Patient information was obtained from: EMS, patient    Use of : N/A        Bushra Mccoy is a 84 year old female with a pertinent history of Alzheimer's disease and overactive urinary bladder, who presents to this ED via ambulance for evaluation of right shoulder injury following a fall.    Per EMS, the patient got out of bed to go use the bathroom at ~10 PM last night because she felt like she was going to have an episode of urine incontinence. She fell and hit her right shoulder on the dresser. She denies hitting her head. She also denies loss of consciousness. The patient crawled to her night stand. The patient was on the ground the whole time until the paramedics arrived at the scene. She is not on blood thinners. The patient is shaky upon arrival due to the cold, per EMS. Her BP was 170s/66 and her pulse was 82 with EMS. The patient was able to walk after this fall but only with assistance on both sides (two person assistance). The patient has been very slow and shaky. They didn't notice any bone deformities. She has been able to move her right shoulder, but she did scream in pain when her shoulder pain worsens. No other reported complaints or concerns at this time.    PAST MEDICAL HISTORY:  No past medical history on file.    PAST SURGICAL HISTORY:  No past surgical history on file.        CURRENT MEDICATIONS:    No current outpatient medications on file.      ALLERGIES:  Allergies   Allergen Reactions    Bactrim [Sulfamethoxazole-Trimethoprim] Rash       FAMILY HISTORY:  No family history on file.    SOCIAL HISTORY:   Social History     Socioeconomic History    Marital status:        VITALS:  BP (!) 148/60   Pulse 67   Temp 97.4  F (36.3  C) (Oral)   Resp 18   Ht 1.473 m (4' 10\")   Wt 49.9 kg (110 lb)   SpO2 99%   BMI 22.99 kg/m      PHYSICAL EXAM    Physical Exam  Vitals and nursing note reviewed. "   Constitutional:       General: She is not in acute distress.     Appearance: Normal appearance. She is normal weight. She is not ill-appearing.   HENT:      Head: Normocephalic and atraumatic.      Nose: Nose normal.      Mouth/Throat:      Mouth: Mucous membranes are moist.   Eyes:      Extraocular Movements: Extraocular movements intact.      Conjunctiva/sclera: Conjunctivae normal.      Pupils: Pupils are equal, round, and reactive to light.   Cardiovascular:      Rate and Rhythm: Normal rate and regular rhythm.      Pulses: Normal pulses.      Heart sounds: Normal heart sounds. No murmur heard.  Pulmonary:      Effort: Pulmonary effort is normal. No respiratory distress.      Breath sounds: Normal breath sounds.   Chest:      Chest wall: No tenderness.   Abdominal:      General: Abdomen is flat. There is no distension.      Palpations: Abdomen is soft.      Tenderness: There is no abdominal tenderness.      Comments: Pelvis stable, nontender   Musculoskeletal:         General: Tenderness (Right proximal humerus with associated swelling and ecchymosis.) present. Normal range of motion.      Cervical back: Normal range of motion. No bony tenderness.      Thoracic back: No bony tenderness.      Lumbar back: No bony tenderness.      Right lower leg: No edema.      Left lower leg: No edema.   Skin:     General: Skin is warm and dry.      Capillary Refill: Capillary refill takes less than 2 seconds.   Neurological:      General: No focal deficit present.      Mental Status: She is alert and oriented to person, place, and time. Mental status is at baseline.   Psychiatric:         Mood and Affect: Mood normal.         Behavior: Behavior normal.         Thought Content: Thought content normal.         Judgment: Judgment normal.            LAB:  All pertinent labs reviewed and interpreted.  Results for orders placed or performed during the hospital encounter of 01/19/25   Humerus XR, G/E 2 views, right    Impression     IMPRESSION:   1.  An apparent subtle linear lucency within the cortex of the lateral aspect of the right humeral neck and apparent foreshortening of the humeral neck, suspicious for a mildly impacted fracture that could be acute or subacute. If clinically relevant, a   CT or MR of the shoulder could be considered for confirmation of a recent fracture.  2.  No other findings suspicious for acute fracture or malalignment of the right shoulder or humerus.     XR Shoulder Right 2 Views    Impression    IMPRESSION:   1.  An apparent subtle linear lucency within the cortex of the lateral aspect of the right humeral neck and apparent foreshortening of the humeral neck, suspicious for a mildly impacted fracture that could be acute or subacute. If clinically relevant, a   CT or MR of the shoulder could be considered for confirmation of a recent fracture.  2.  No other findings suspicious for acute fracture or malalignment of the right shoulder or humerus.     CT Shoulder Right w/o Contrast    Impression    IMPRESSION:  1.  Impacted fracture involving the surgical neck of the proximal right humerus. Subtle sclerosis along the fracture lines suggests a subacute fracture. Degenerative changes right shoulder. Distal right rotator cuff calcific arthropathy.    2.  Superficial soft tissue swelling and bruising anteriorly.    3.  Right thyroid lobe hypodense nodule can be better evaluated with dedicated ultrasound.         RADIOLOGY:  Reviewed all pertinent imaging. Please see official radiology report.  CT Shoulder Right w/o Contrast   Final Result   IMPRESSION:   1.  Impacted fracture involving the surgical neck of the proximal right humerus. Subtle sclerosis along the fracture lines suggests a subacute fracture. Degenerative changes right shoulder. Distal right rotator cuff calcific arthropathy.      2.  Superficial soft tissue swelling and bruising anteriorly.      3.  Right thyroid lobe hypodense nodule can be better evaluated  with dedicated ultrasound.         XR Shoulder Right 2 Views   Final Result   IMPRESSION:    1.  An apparent subtle linear lucency within the cortex of the lateral aspect of the right humeral neck and apparent foreshortening of the humeral neck, suspicious for a mildly impacted fracture that could be acute or subacute. If clinically relevant, a    CT or MR of the shoulder could be considered for confirmation of a recent fracture.   2.  No other findings suspicious for acute fracture or malalignment of the right shoulder or humerus.         Humerus XR, G/E 2 views, right   Final Result   IMPRESSION:    1.  An apparent subtle linear lucency within the cortex of the lateral aspect of the right humeral neck and apparent foreshortening of the humeral neck, suspicious for a mildly impacted fracture that could be acute or subacute. If clinically relevant, a    CT or MR of the shoulder could be considered for confirmation of a recent fracture.   2.  No other findings suspicious for acute fracture or malalignment of the right shoulder or humerus.             PROCEDURES:   N/A      [facility] Documentation:   CMS Diagnoses:              I, [place], am serving as a scribe to document services personally performed by Glen Hutchinson MD based on my observation and the provider's statements to me. I, Glen Hutchinson MD, attest that [place] is acting in a scribe capacity, has observed my performance of the services and has documented them in accordance with my direction.    Glen Hutchinson MD  [facility] EMERGENCY DEPARTMENT  [address] [place] [id number]  [phone]       Glen Hutchinson MD  01/19/25 0652

## 2025-01-19 NOTE — CONSULTS
ORTHOPEDIC CONSULTATION    Consultation  Bushra Mccoy,  1940, MRN [id number]    Closed fracture of proximal end of right humerus, unspecified fracture morphology, initial encounter [S42.201A]  Thyroid nodule [E04.1]  Fall at home, initial encounter [W19.XXXA, Y92.009]    PCP: Shashank Alexandre, [phone]   Code status:  Full Code       Extended Emergency Contact Information  Primary Emergency Contact: Bc Mccoy   [facility]  Home Phone: [phone]  Relation: Other  Secondary Emergency Contact: Anna Garrett   [facility]  Home Phone: [phone]  Relation: Other         IMPRESSION:  Right proximal humerus fracture, acute versus subacute     PLAN:   today had a pleasant discussion with the patient regarding her above assessment.  I reviewed her clinical presentation in detail as well as her radiographic images.  Based on her injury, we discussed nonoperative and operative management.  At this time we would focus on nonoperative modalities with supportive sling and outpatient follow-up to discuss ongoing management.  Based on her injury, we would recommend outpatient follow-up to discuss nonoperative versus operative management going forward.    -Sling  -Nonweightbearing right upper extremity  -PT/OT  -Placement by primary  -Follow-up with [place] [facility] outpatient within 2 weeks        CHIEF COMPLAINT: <principal problem not specified>    HISTORY OF PRESENT ILLNESS:  84-year-old female with past medical history Alzheimer's who presented to ED via EMS for evaluation of right shoulder injury after fall.  Admits to falling onto her right side last evening while changing her pad.  Admits to significant shoulder pain.  Unable to recall an additional recent shoulder injury.      ALLERGIES:   Review of patient's allergies indicates   Allergies   Allergen Reactions    Imipramine Unknown    Ofloxacin Unknown    Penicillins Unknown    Quinolones Unknown    Tricyclic Antidepressants  Unknown    Bactrim [Sulfamethoxazole-Trimethoprim] Rash         MEDICATIONS UPON ADMISSION:  Medications were reviewed.  They include:         FAMILY HISTORY:  family history is not on file.      REVIEW OF SYSTEMS:   Reviewed with patient. See HPI, otherwise negative       PHYSICAL EXAMINATION:  Vitals: Temp:  [97.4  F (36.3  C)-98.5  F (36.9  C)] 98.5  F (36.9  C)  Pulse:  [67-80] 80  Resp:  [18] 18  BP: (148-192)/(60-87) 162/69  SpO2:  [92 %-100 %] 95 %  General: Nonacute distress, alert  Pulmonary: Nonlabored breathing on room air at rest  Cardiac: Regular heart rate to peripheral pulse  Focused examination right upper extremity:  Intact skin over the lateral arm and shoulder  [place] axillary nerve dissipation of shoulder, medial/radial/ulnar nerve distributions of the hand  Tolerates movement at the elbow and wrist/hand  Fires FPL, EPL and interosseous muscles  2+ radial pulse      RADIOGRAPHIC EVALUATION:  CT right shoulder (performed today): Personally reviewed today.  Reveals impacted fracture of the right proximal femur surgical neck, with mild sclerosis suggestive of possible subacute fracture.  Degenerative changes of the right glenohumeral joint.        Kel Moran MD, MD  [facility]  Date: 1/19/2025  Time: 8:51 AM    CC1:   Jayesh Marx MD    CC2:   Shashank Alexandre

## 2025-01-19 NOTE — PROGRESS NOTES
01/19/25 1140   Appointment Info   Signing Clinician's Name / Credentials (PT) Nemo Cee DPT   Living Environment   People in Home alone   Current Living Arrangements independent living facility   Home Accessibility no concerns   Self-Care   Usual Activity Tolerance good   Current Activity Tolerance fair   Equipment Currently Used at Home none   Fall history within last six months yes   Activity/Exercise/Self-Care Comment normally ind w/ mobility and ADLs, family looking into intermediate   General Information   Onset of Illness/Injury or Date of Surgery 01/19/25   Referring Physician Jayesh Marx MD   Patient/Family Therapy Goals Statement (PT) family wants TCU   Pertinent History of Current Problem (include personal factors and/or comorbidities that impact the POC) Bushra Mccoy is a 84 year old female with a pertinent history of Alzheimer's disease and overactive urinary bladder, who presents to this ED via ambulance for evaluation of right shoulder injury following a fall.   Existing Precautions/Restrictions fall;shoulder  (sling per ortho)   Weight-Bearing Status - RUE nonweight-bearing   Cognition   Affect/Mental Status (Cognition) anxious   Pain Assessment   Patient Currently in Pain Yes, see Vital Sign flowsheet  (with movement)   Range of Motion (ROM)   Range of Motion ROM is WFL   ROM Comment BLEs   Strength (Manual Muscle Testing)   Strength (Manual Muscle Testing) Deficits observed during functional mobility   Bed Mobility   Bed Mobility supine-sit   Supine-Sit [place] (Bed Mobility) moderate assist (50% patient effort)   Bed Mobility Limitations decreased ability to use arms for pushing/pulling   Impairments Contributing to Impaired Bed Mobility pain;decreased strength   Comment, (Bed Mobility) assist for trunk into upright and BLEs towards EOB   Transfers   Transfers sit-stand transfer;bed-chair transfer   Bed-Chair Transfer   Assistive Device (Bed-Chair Transfers)   (HHA)   Bed-Chair  [place] (Transfers) minimum assist (75% patient effort);1 person assist   Comment, (Bed-Chair Transfer) declines use of SEC   Sit-Stand Transfer   Sit-Stand [place] (Transfers) minimum assist (75% patient effort)   Assistive Device (Sit-Stand Transfers)   (HHA)   Comment, (Sit-Stand Transfer) unsteady   Gait/Stairs (Locomotion)   [place] Level (Gait) minimum assist (75% patient effort);moderate assist (50% patient effort)   Assistive Device (Gait)   (HHA)   Distance in Feet (Gait) 5   Pattern (Gait) step-to   Deviations/Abnormal Patterns (Gait) base of support, narrow;gait speed decreased   Balance   Balance other (describe)   Balance Comments static sitting SBA-CGA. static standing with HHA Abisai   Clinical Impression   Criteria for Skilled Therapeutic Intervention Yes, treatment indicated   PT Diagnosis (PT) impaired functional mobility, gait abnormality   Influenced by the following impairments decreased strength, decreased endurance   Functional limitations due to impairments gait, transfers, bed mob   Clinical Presentation (PT Evaluation Complexity) stable   Clinical Presentation Rationale pt presents as medically diagnosed   Clinical Decision Making (Complexity) low complexity   Planned Therapy Interventions (PT) balance training;bed mobility training;gait training;home exercise program;neuromuscular re-education;patient/family education;strengthening;transfer training;stair training   Risk & Benefits of therapy have been explained evaluation/treatment results reviewed;patient   PT Total Evaluation Time   PT Suadal, Low Complexity Minutes (77321) 10   Physical Therapy Goals   PT Frequency 6x/week   PT Predicted Duration/Target Date for Goal Attainment 01/26/25   PT Goals Bed Mobility;Transfers;Gait   PT: Bed Mobility Supervision/stand-by assist;Supine to/from sit;Within precautions   PT: Transfers Supervision/stand-by assist;Sit to/from stand;Bed to/from chair;Within precautions;Assistive device    PT: Gait Supervision/stand-by assist;Assistive device;Straight cane;100 feet;Within precautions   Interventions   Interventions Quick Adds Gait Training   Gait Training   Gait Training Minutes (41854) 8   Symptoms Noted During/After Treatment (Gait Training) fatigue   Treatment Detail/Skilled Intervention amb x additional 30' with HHA x 1 on L. Slows with inc amb d/t fatigue and needing to take short st anding rest break. After cues for breathing techniques, pt much more calm and focused on ambulation. Gets anxious during ambulation.   Distance in Feet 30'   [place] Level (Gait Training) minimum assist (75% patient effort)   Physical Assistance Level (Gait Training) verbal cues;1 person assist   Weight Bearing (Gait Training) nonweight-bearing  (RUE - in sling)   Assistive Device (Gait Training)   (HHA)   PT Discharge Planning   PT Plan amb w/ SEC vs HHA (pt does not like cane typically). sit <> stand, higher level balance   PT Discharge Recommendation (DC Rec) Transitional Care Facility   PT Rationale for DC Rec pt lives alone, unsteady, needing Abisai for mobility   PT Brief overview of current status amb x 35' total, Abisai-modA   PT Total Distance Amb During Session (feet) 35   PT Equipment Needed at Discharge cane, straight   Physical Therapy Time and Intention   Timed Code Treatment Minutes 8   Total Session Time (sum of timed and untimed services) 18     [facility]                                                                                   OUTPATIENT PHYSICAL THERAPY    PLAN OF TREATMENT FOR OUTPATIENT REHABILITATION   Patient's Last Name, First Name, Bushra Dee YOB: 1940   Provider's Name   [facility]   Medical Record No.  [id number]     Onset Date: 01/19/25 Start of Care Date: (P) 01/19/25     Medical Diagnosis:  (P) fall at home               PT Diagnosis:  impaired functional mobility, gait abnormality  Certification Dates:  From: (P) 01/19/25  To: (P) 01/26/25       See note for plan of treatment, functional goals, and certification details.    I CERTIFY THE NEED FOR THESE SERVICES FURNISHED UNDER        THIS PLAN OF TREATMENT AND WHILE UNDER MY CARE (Physician co-signature of this document indicates review and certification of the therapy plan).

## 2025-01-19 NOTE — CONSULTS
"Care Management Initial Consult    General Information  Assessment completed with: Patient, Children, Bushra and daughter Anna (\"Sonia\")  Type of CM/SW Visit: Initial Assessment    Primary Care Provider verified and updated as needed: Yes   Readmission within the last 30 days: no previous admission in last 30 days      Reason for Consult: discharge planning  Advance Care Planning: Advance Care Planning Reviewed: no concerns identified          Communication Assessment  Patient's communication style: spoken language (English or Bilingual)             Cognitive  Cognitive/Neuro/Behavioral: .WDL except, orientation  Level of Consciousness: alert     Orientation: disoriented to, situation             Living Environment:   People in home: alone     Current living Arrangements: apartment, independent living facility ([facility])      Able to return to prior arrangements: other (see comments) (likely needs TCU)       Family/Social Support:  Care provided by: self, child(maru)  Provides care for: no one, unable/limited ability to care for self  Marital Status:  (\" about 7 years\")  Support system: Children (\"Son Bc helps with all her financial needs. Daughter Anna \"Sonia\" help with more medical/physical needs\".)          Description of Support System: Supportive, Involved    Support Assessment: Adequate family and caregiver support, Adequate social supports, Patient communicates needs well met    Current Resources:   Patient receiving home care services: No        Community Resources: DME  Equipment currently used at home: cane, straight (\"She is suddenly having a lot of balance issues. She has a cane at home, but rarely wants to use it. We are going to order a walker for her.\")  Supplies currently used at home: Incontinence Supplies, Other (\"I wear a brief at night and a pad all other times. Glasses at home\".)    Employment/Financial:  Employment " "Status: retired     Employment/ Comments: \"no  history\"  Financial Concerns: none   Referral to Financial Worker: No       Does the patient's insurance plan have a 3 day qualifying hospital stay waiver?  No    Lifestyle & Psychosocial Needs:  Social Drivers of Health     Food Insecurity: Not on file   Depression: Not on file   Housing Stability: Not on file   Tobacco Use: Not on file   Financial Resource Strain: Not on file   Alcohol Use: Not on file   Transportation Needs: Not on file   Physical Activity: Not on file   Interpersonal Safety: Not on file   Stress: Not on file   Social Connections: Not on file   Health Literacy: Not on file       Functional Status:  Prior to admission patient needed assistance:   Dependent ADLs:: Independent, Ambulation-no assistive device (\"She has a cane at home, but rarely wants to use it\".)  Dependent IADLs:: Cleaning, Laundry, Shopping, Money Management, Transportation  Assesssment of Functional Status: Not at baseline with ADL Functioning, Not at baseline with mobility, Not at  functional baseline    Mental Health Status:  Mental Health Status: Past Concern  Mental Health Management: Medication    Chemical Dependency Status:  Chemical Dependency Status: No Current Concerns             Values/Beliefs:  Spiritual, Cultural Beliefs, Zoroastrian Practices, Values that affect care: no       Cultural/Zoroastrian Practices Patient Routinely Participates In: ceremony, prayer  Values/Beliefs Comment: Shinto    Discussed  Partnership in Safe Discharge Planning  document with patient/family: No    Additional Information:  Bushra recently moved into [facility], but family is also going to start to look for Assisted Living for her.    \"She is suddenly having a lot of balance issues. She has a cane at home, but rarely wants to use it. We are going to order a walker for her.\"    Pt and daughter given the TCU list and " "daughter will look at Medicare.gov. But for now daughter wants: \"1. [facility] because her  was there. 2. [facility] because it is close to her daughter, 3. [facility] because also closer to daughter, 4. [facility] because also closer to the daughter. Referrals sent.    Daughter to transport at discharge.    CM to follow for medical progression of care, discharge recommendations, and final discharge plan. Writer verified patient demographics and updated any changes needed in the patient chart.    Anna \"Sonia\" Darius daughter: [phone]. \"Please call me for any needs. My brother Bc helps with her finances, but he is in [place] right now so he is really not reachable\".    Next Steps:   Plan: Likely needs TCU and she and family want TCU.  Needs: Awaiting PT/OT recommendations, but TCU referrals sent.    Marilee Flores RN    "

## 2025-01-19 NOTE — PHARMACY-ADMISSION MEDICATION HISTORY
Pharmacist Admission Medication History    Admission medication history is complete. The information provided in this note is only as accurate as the sources available at the time of the update.    Information Source(s): Patient, Patient's pharmacy, Clinic records, Hospital records, and CareEverywhere/SureScripts via in-person and phone    Pertinent Information: Patient is unable to give names of medication.  Used pharmacy record and chart review to complete med history.  No family available that would know her medications as she takes care of them herself.    Changes made to PTA medication list:  Added: Atorvastatin, Donepezil, Estradiol cream, Fluoxetine, Ipratropium Nasal spray, Lorazepam, Tolterodine ER, Multivitamin  Deleted: None  Changed: None    Allergies reviewed with patient and updates made in EHR: yes    Medication History Completed By: Maggie Rosa [facility] 1/19/2025 8:21 AM    PTA Med List   Medication Sig Last Dose/Taking    atorvastatin (LIPITOR) 20 MG tablet Take 20 mg by mouth every evening. 1/18/2025 Evening    donepezil (ARICEPT) 23 MG tablet Take 1 tablet by mouth at bedtime. 1/18/2025 Morning    estradiol (ESTRACE) 0.1 MG/GM vaginal cream Place vaginally three times a week. Past Week    FLUoxetine (PROZAC) 40 MG capsule Take 40 mg by mouth daily. 1/18/2025 Morning    ipratropium (ATROVENT) 0.03 % nasal spray Spray 2 sprays into both nostrils 3 times daily. 1/18/2025 Morning    LORazepam (ATIVAN) 0.5 MG tablet Take 0.5 mg by mouth nightly as needed for anxiety. 1/18/2025 Bedtime    multivitamin w/minerals (THERA-VIT-M) tablet Take 1 tablet by mouth daily. 1/18/2025 Morning    tolterodine ER (DETROL LA) 4 MG 24 hr capsule Take 4 mg by mouth daily. 1/18/2025 Morning

## 2025-01-19 NOTE — ED TRIAGE NOTES
Pt brought by EMS from an independent living, [facility], for slipping out of bed and hitting her right shoulder/ right upper arm on a drawer. Denies hitting head, not on blood thinner, and no loss of consciousness.      Triage Assessment (Adult)       Row Name 01/19/25 0429          Triage Assessment    Airway WDL WDL        Respiratory WDL    Respiratory WDL WDL        Cardiac WDL    Cardiac WDL WDL

## 2025-01-20 ENCOUNTER — DOCUMENTATION ONLY (OUTPATIENT)
Dept: GERIATRICS | Facility: CLINIC | Age: 85
End: 2025-01-20
Payer: COMMERCIAL

## 2025-01-20 ENCOUNTER — APPOINTMENT (OUTPATIENT)
Dept: PHYSICAL THERAPY | Facility: HOSPITAL | Age: 85
End: 2025-01-20
Payer: COMMERCIAL

## 2025-01-20 ENCOUNTER — APPOINTMENT (OUTPATIENT)
Dept: OCCUPATIONAL THERAPY | Facility: HOSPITAL | Age: 85
End: 2025-01-20
Attending: INTERNAL MEDICINE
Payer: COMMERCIAL

## 2025-01-20 VITALS
HEART RATE: 79 BPM | SYSTOLIC BLOOD PRESSURE: 120 MMHG | DIASTOLIC BLOOD PRESSURE: 56 MMHG | OXYGEN SATURATION: 100 % | RESPIRATION RATE: 17 BRPM | TEMPERATURE: 98.2 F | HEIGHT: 58 IN | BODY MASS INDEX: 23.09 KG/M2 | WEIGHT: 110 LBS

## 2025-01-20 PROBLEM — N76.0 ACUTE VAGINITIS: Status: ACTIVE | Noted: 2023-09-26

## 2025-01-20 PROBLEM — K59.03 DRUG-INDUCED CONSTIPATION: Status: ACTIVE | Noted: 2025-01-20

## 2025-01-20 PROBLEM — N32.81 OVERACTIVE BLADDER: Status: ACTIVE | Noted: 2023-09-26

## 2025-01-20 PROBLEM — F33.40 MAJOR DEPRESSIVE DISORDER, RECURRENT, IN REMISSION, UNSPECIFIED: Status: ACTIVE | Noted: 2023-04-24

## 2025-01-20 PROBLEM — J30.2 SEASONAL ALLERGIES: Status: ACTIVE | Noted: 2023-04-24

## 2025-01-20 PROBLEM — K57.30 DIVERTICULAR DISEASE OF LARGE INTESTINE: Status: ACTIVE | Noted: 2021-12-07

## 2025-01-20 PROBLEM — Z86.0100 HISTORY OF COLONIC POLYPS: Status: ACTIVE | Noted: 2021-12-09

## 2025-01-20 PROBLEM — G30.9 ALZHEIMER'S DISEASE (H): Status: ACTIVE | Noted: 2023-09-26

## 2025-01-20 PROBLEM — F02.80 ALZHEIMER'S DISEASE (H): Status: ACTIVE | Noted: 2023-09-26

## 2025-01-20 PROBLEM — K63.5 POLYP OF COLON: Status: ACTIVE | Noted: 2021-12-07

## 2025-01-20 PROBLEM — F41.1 GENERALIZED ANXIETY DISORDER: Status: ACTIVE | Noted: 2023-04-24

## 2025-01-20 PROBLEM — F41.9 ANXIETY DISORDER: Status: ACTIVE | Noted: 2023-09-26

## 2025-01-20 LAB
ANION GAP SERPL CALCULATED.3IONS-SCNC: 11 MMOL/L (ref 7–15)
BUN SERPL-MCNC: 17 MG/DL (ref 8–23)
CALCIUM SERPL-MCNC: 9.6 MG/DL (ref 8.8–10.4)
CHLORIDE SERPL-SCNC: 104 MMOL/L (ref 98–107)
CREAT SERPL-MCNC: 0.79 MG/DL (ref 0.51–0.95)
EGFRCR SERPLBLD CKD-EPI 2021: 74 ML/MIN/1.73M2
ERYTHROCYTE [DISTWIDTH] IN BLOOD BY AUTOMATED COUNT: 12.6 % (ref 10–15)
GLUCOSE SERPL-MCNC: 118 MG/DL (ref 70–99)
HCO3 SERPL-SCNC: 25 MMOL/L (ref 22–29)
HCT VFR BLD AUTO: 28.7 % (ref 35–47)
HGB BLD-MCNC: 9.5 G/DL (ref 11.7–15.7)
MCH RBC QN AUTO: 31.9 PG (ref 26.5–33)
MCHC RBC AUTO-ENTMCNC: 33.1 G/DL (ref 31.5–36.5)
MCV RBC AUTO: 96 FL (ref 78–100)
PLATELET # BLD AUTO: 83 10E3/UL (ref 150–450)
POTASSIUM SERPL-SCNC: 3.9 MMOL/L (ref 3.4–5.3)
RBC # BLD AUTO: 2.98 10E6/UL (ref 3.8–5.2)
SODIUM SERPL-SCNC: 140 MMOL/L (ref 135–145)
WBC # BLD AUTO: 7.1 10E3/UL (ref 4–11)

## 2025-01-20 PROCEDURE — 97116 GAIT TRAINING THERAPY: CPT | Mod: GP

## 2025-01-20 PROCEDURE — 97530 THERAPEUTIC ACTIVITIES: CPT | Mod: GP

## 2025-01-20 PROCEDURE — 99207 PR NO BILLABLE SERVICE THIS VISIT: CPT | Performed by: INTERNAL MEDICINE

## 2025-01-20 PROCEDURE — 84520 ASSAY OF UREA NITROGEN: CPT | Performed by: INTERNAL MEDICINE

## 2025-01-20 PROCEDURE — 97535 SELF CARE MNGMENT TRAINING: CPT | Mod: GO

## 2025-01-20 PROCEDURE — 97166 OT EVAL MOD COMPLEX 45 MIN: CPT | Mod: GO

## 2025-01-20 PROCEDURE — G0378 HOSPITAL OBSERVATION PER HR: HCPCS

## 2025-01-20 PROCEDURE — 97110 THERAPEUTIC EXERCISES: CPT | Mod: GP

## 2025-01-20 PROCEDURE — 250N000013 HC RX MED GY IP 250 OP 250 PS 637: Performed by: INTERNAL MEDICINE

## 2025-01-20 PROCEDURE — 85014 HEMATOCRIT: CPT | Performed by: INTERNAL MEDICINE

## 2025-01-20 PROCEDURE — 36415 COLL VENOUS BLD VENIPUNCTURE: CPT | Performed by: INTERNAL MEDICINE

## 2025-01-20 PROCEDURE — 99239 HOSP IP/OBS DSCHRG MGMT >30: CPT | Performed by: INTERNAL MEDICINE

## 2025-01-20 RX ORDER — ACETAMINOPHEN 325 MG/1
650 TABLET ORAL 3 TIMES DAILY
DISCHARGE
Start: 2025-01-20

## 2025-01-20 RX ORDER — AMOXICILLIN 250 MG
1 CAPSULE ORAL 2 TIMES DAILY PRN
DISCHARGE
Start: 2025-01-20

## 2025-01-20 RX ORDER — HYDRALAZINE HYDROCHLORIDE 20 MG/ML
5 INJECTION INTRAMUSCULAR; INTRAVENOUS EVERY 6 HOURS PRN
Status: DISCONTINUED | OUTPATIENT
Start: 2025-01-20 | End: 2025-01-20 | Stop reason: HOSPADM

## 2025-01-20 RX ORDER — OXYCODONE HYDROCHLORIDE 5 MG/1
5 TABLET ORAL EVERY 4 HOURS PRN
Qty: 20 TABLET | Refills: 0 | Status: SHIPPED | OUTPATIENT
Start: 2025-01-20 | End: 2025-01-22

## 2025-01-20 RX ADMIN — FLUOXETINE HYDROCHLORIDE 40 MG: 20 CAPSULE ORAL at 09:14

## 2025-01-20 RX ADMIN — IPRATROPIUM BROMIDE 2 SPRAY: 21 SPRAY, METERED NASAL at 09:14

## 2025-01-20 RX ADMIN — LORATADINE 10 MG: 10 TABLET ORAL at 09:13

## 2025-01-20 RX ADMIN — ACETAMINOPHEN 650 MG: 325 TABLET ORAL at 13:29

## 2025-01-20 RX ADMIN — TOLTERODINE 4 MG: 2 CAPSULE, EXTENDED RELEASE ORAL at 09:13

## 2025-01-20 RX ADMIN — IPRATROPIUM BROMIDE 2 SPRAY: 21 SPRAY, METERED NASAL at 13:30

## 2025-01-20 RX ADMIN — ESTRADIOL 2 G: 0.1 CREAM VAGINAL at 09:14

## 2025-01-20 ASSESSMENT — ACTIVITIES OF DAILY LIVING (ADL)
ADLS_ACUITY_SCORE: 41
ADLS_ACUITY_SCORE: 41
ADLS_ACUITY_SCORE: 45
ADLS_ACUITY_SCORE: 41
ADLS_ACUITY_SCORE: 45
ADLS_ACUITY_SCORE: 45
ADLS_ACUITY_SCORE: 41
ADLS_ACUITY_SCORE: 41
ADLS_ACUITY_SCORE: 45
ADLS_ACUITY_SCORE: 41
PREVIOUS_RESPONSIBILITIES: MEAL PREP;HOUSEKEEPING;LAUNDRY;FINANCES;MEDICATION MANAGEMENT
ADLS_ACUITY_SCORE: 41
ADLS_ACUITY_SCORE: 45
ADLS_ACUITY_SCORE: 45
ADLS_ACUITY_SCORE: 41
ADLS_ACUITY_SCORE: 45
ADLS_ACUITY_SCORE: 41

## 2025-01-20 NOTE — CONSULTS
Care Management Discharge Note    Discharge Date: 01/20/2025       Discharge Disposition: Transitional Care    Discharge Services: None    Discharge DME: None    Discharge Transportation: family or friend will provide    Private pay costs discussed: Not applicable    Does the patient's insurance plan have a 3 day qualifying hospital stay waiver?  No    PAS Confirmation Code: [id number]  Patient/family educated on Medicare website which has current facility and service quality ratings: yes    Education Provided on the Discharge Plan: Yes  Persons Notified of Discharge Plans: daughter MD Sonia  Patient/Family in Agreement with the Plan:      Handoff Referral Completed: No, handoff not indicated or clinically appropriate    Additional Information:    Plan for pt to discharge to [facility] TCU today around 4/430 PM. Dtr Sonia will transport pt.       DENNIS George

## 2025-01-20 NOTE — PROGRESS NOTES
01/20/25 1001   Appointment Info   Signing Clinician's Name / Credentials (OT) Marnie Bueno OT   Living Environment   People in Home alone   Current Living Arrangements independent living facility   Home Accessibility no concerns   Transportation Anticipated family or friend will provide   Living Environment Comments was independent w\her ADls and mobility   Self-Care   Usual Activity Tolerance good   Current Activity Tolerance fair   Equipment Currently Used at Home cane, straight;grab bar, toilet;grab bar, tub/shower;raised toilet seat;shower chair   Fall history within last six months yes   Number of times patient has fallen within last six months 1   Instrumental Activities of Daily Living (IADL)   Previous Responsibilities meal prep;housekeeping;laundry;finances;medication management  (family shop/drive)   General Information   Onset of Illness/Injury or Date of Surgery 01/19/25   Referring Physician Dr Burleson   Patient/Family Therapy Goal Statement (OT) go to TCU   Additional Occupational Profile Info/Pertinent History of Current Problem Bushra Mccoy is a 84 year old female with a pertinent history of Alzheimer's disease and overactive urinary bladder, who presents to this ED via ambulance for evaluation of right shoulder injury following a fall.   Existing Precautions/Restrictions fall;weight bearing;other (see comments)  (NWB RUE, sling RUE at all times)   Left Upper Extremity (Weight-bearing Status) full weight-bearing (FWB)   Right Upper Extremity (Weight-bearing Status) non weight-bearing (NWB)   Left Lower Extremity (Weight-bearing Status) full weight-bearing (FWB)   Right Lower Extremity (Weight-bearing Status) full weight-bearing (FWB)   Cognitive Status Examination   Orientation Status person;place   Follows Commands follows multi-step commands;50-74% accuracy   Visual Perception   Visual Impairment/Limitations corrective lenses full-time   Sensory   Sensory Quick Adds sensation intact   Pain  Assessment   Patient Currently in Pain Yes, see Vital Sign flowsheet  (RUE)   Posture   Posture forward head position   Range of Motion Comprehensive   General Range of Motion upper extremity range of motion deficits identified   Comment, General Range of Motion due to fx humerus RUE   General Upper Extremity Assessment (Range of Motion)   Upper Extremity: Range of Motion LUE ROM WNL   Strength Comprehensive (MMT)   General Manual Muscle Testing (MMT) Assessment upper extremity strength deficits identified   Comment, General Manual Muscle Testing (MMT) Assessment due to RUE humerus fx   Upper Extremity (Manual Muscle Testing)   Upper Extremity: Manual Muscle Testing (MMT) left UE strength is WNL;left shoulder strength deficit   Comment, MMT: Upper Extremity RUE deficit due to humerus fx   Muscle Tone Assessment   Muscle Tone Quick Adds No deficits were identified   Coordination   Upper Extremity Coordination Right UE impaired  (due to swelling of RUE secondary to humerus fx)   Bed Mobility   Bed Mobility supine-sit;sit-supine   Supine-Sit [place] (Bed Mobility) maximum assist (25% patient effort)   Sit-Supine [place] (Bed Mobility) maximum assist (25% patient effort)   Transfers   Transfers sit-stand transfer   Sit-Stand Transfer   Sit-Stand [place] (Transfers) moderate assist (50% patient effort)   Sit/Stand Transfer Comments w\ hand hold assist   Balance   Balance Assessment sit to stand dynamic balance   Sit-to-Stand Balance moderate assist;1-person assist   Activities of Daily Living   BADL Assessment/Intervention lower body dressing   Lower Body Dressing Assessment/Training   Comment, (Lower Body Dressing) due to decreased 1 hand use   [place] Level (Lower Body Dressing) maximum assist (25% patient effort)   Clinical Impression   Criteria for Skilled Therapeutic Interventions Met (OT) Yes, treatment indicated   OT Diagnosis R humerus fx   Influenced by the following impairments fatigue,  decreased ADLs   OT Problem List-Impairments impacting ADL activity tolerance impaired;balance;communication   Assessment of Occupational Performance 3-5 Performance Deficits   Identified Performance Deficits fatigue, decreased ADLs.balance, cognition, fatigue   Planned Therapy Interventions (OT) ADL retraining;cognition   Clinical Decision Making Complexity (OT) detailed assessment/moderate complexity   Risk & Benefits of therapy have been explained evaluation/treatment results reviewed;care plan/treatment goals reviewed;risks/benefits reviewed;participants voiced agreement with care plan   OT Total Evaluation Time   OT Eval, Moderate Complexity Minutes (13855) 10   OT Goals   Therapy Frequency (OT) 5 times/week   OT Predicted Duration/Target Date for Goal Attainment 01/26/25   OT Goals Hygiene/Grooming;Upper Body Dressing;Lower Body Dressing;Toilet Transfer/Toileting;Cognition   OT: Hygiene/Grooming supervision/stand-by assist   OT: Upper Body Dressing Supervision/stand-by assist   OT: Lower Body Dressing Supervision/stand-by assist   OT: Toilet Transfer/Toileting Supervision/stand-by assist   OT: Cognitive Patient/caregiver will verbalize understanding of cognitive assessment results/recommendations as needed for safe discharge planning   Interventions   Interventions Quick Adds Self-Care/Home Management   Self-Care/Home Management   Self-Care/Home Mgmt/ADL, Compensatory, Meal Prep Minutes (08632) 10   Symptoms Noted During/After Treatment (Meal Preparation/Planning Training) fatigue   Treatment Detail/Skilled Intervention transfers Mod A of 1 pt took 2 steps, G/H mod A due to decreased 1 hand use, UB/LB dress max A due to decreased 1 hand use.   OT Discharge Planning   OT Plan transfers/toileting, G/H, TB dress, sling to RUE   OT Discharge Recommendation (DC Rec) Transitional Care Facility   OT Rationale for DC Rec tpt has a decline in her ADLs and mobility needing a TCU to increase skills to PLOF   OT Brief  overview of current status transfer Mod A, ADLs Max A   Total Session Time   Timed Code Treatment Minutes 10   Total Session Time (sum of timed and untimed services) 20

## 2025-01-20 NOTE — DISCHARGE SUMMARY
[facility]    Discharge Summary  Hospitalist    Date of Admission:  1/19/2025  Date of Discharge:  1/20/2025  Discharging Provider: Nilo Burleson MD  Date of Service (when I saw the patient): 01/20/25    Discharge Diagnoses   Fall  Right humerus fracture    History of Present Illness   Bushra Mccoy is an 84 year old female who presented with fall    Hospital Course   Bushra Mccoy is a 84 year old female with a pertinent history of Alzheimer's disease and overactive urinary bladder, who presents to this ED via ambulance for evaluation of right shoulder injury following a fall.      Mechanical fall on 1/19, hit right side of shoulder/neck  -Unsure if she had LOC, no chest pain prior, no neuro weakness  -CT head: no acute change. Brain atrophy and presumed chronic microvascular ischemic changes   -C-spine: no acute change  -cardiac monitoring, check EKG   -PT OT evaluation: TCU placement   -Falls precaution  -pain control     Right humerus neck fracture  -No neurovascular deficits on exam  -Xrays show an apparent subtle linear lucency within the cortex of the lateral aspect of the right humeral neck and apparent foreshortening of the humeral neck,   -CT shows impacted fracture involving the surgical neck of the proximal right humerus.   -Orthopedics consulted: Sling. Nonweightbearing right upper extremity. Follow up within 2 w.     Alzheimer's dementia  -Continue home meds, no agitation currently     Overactive bladder  Continue home meds, monitor for retention     Chronic anemia/low platelets   low-grade B-cell lymphoma  Follows with oncology  Had a bone marrow biopsy 2023  Bone marrow study shows low-grade B-cell lymphoma  CBC today shows low platelet, Hb stable  Follow oncology clinic, follow CBC     Incidental finding  Shoulder CT shows incidental thyroid nodule, follow-up in PCP     Postnasal drip  Requesting artificial tears  Also requesting Claritin p.o.         Nilo  MD Sarah Beth    Significant Results and Procedures   See below    Pending Results     Unresulted Labs Ordered in the Past 30 Days of this Admission       No orders found for last 31 day(s).            Code Status   Full Code       Primary Care Physician   Shashank Alexandre    General.  Awake alert oriented not in acute distress.  HEENT.  Pupils equal round react to light, anicteric, EOM intact.  Neck supple no JVD.  CVS regular rhythm no murmur gallops.  Lungs.  Clear to auscultation bilateral no wheezing or rales.  Abdomen.  Soft nontender bowel sounds present.  Extremities.  Right shoulder ecchymosis, arm in sling.  Neurological.  Awake and alert. No focal deficit.  Skin no rash. No pallor.  Psych.impaired memory    Discharge Disposition   Discharged to TCU  Condition at discharge: Fair    Consultations This Hospital Stay   ORTHOPEDIC SURGERY IP CONSULT  PHYSICAL THERAPY ADULT IP CONSULT  CARE MANAGEMENT / SOCIAL WORK IP CONSULT  OCCUPATIONAL THERAPY ADULT IP CONSULT  SOCIAL WORK IP CONSULT  PHYSICAL THERAPY ADULT IP CONSULT  OCCUPATIONAL THERAPY ADULT IP CONSULT    Time Spent on this Encounter   I, Nilo Burleson MD, personally saw the patient today and spent greater than 30 minutes discharging this patient.    Discharge Orders      General info for SNF    Length of Stay Estimate: Short Term Care: Estimated # of Days <30  Condition at Discharge: Improving  Level of care:skilled   Rehabilitation Potential: Fair  Admission H&P remains valid and up-to-date: Yes  Recent Chemotherapy: N/A  Use Nursing Home Standing Orders: Yes     Mantoux instructions    Give two-step Mantoux (PPD) Per Facility Policy Yes     Follow Up and recommended labs and tests    Follow up with Nursing home physician.  No follow up labs or test are needed.    Follow up with orthopedic surgeon as instructed, within 2 weeks     Reason for your hospital stay    Fall  Right humerus fracture  Alzheimer Dementia  HTN     Weight bearing status    Right arm  nonweight bearing until being re-evaluated by orthopedic surgeon in 2 weeks     Additional Discharge Instructions    Sling to right upper extremity. Nonweightbearing right upper extremity. Follow up with orthopedic surgeon within 2 w.     Activity - Up with nursing assistance     Physical Therapy Adult Consult    Evaluate and treat as clinically indicated.    Reason:  falls     Occupational Therapy Adult Consult    Evaluate and treat as clinically indicated.    Reason:  falls     Fall precautions     Diet    Follow this diet upon discharge: Current Diet:Orders Placed This Encounter      Regular Diet Adult     Discharge Medications   Current Discharge Medication List        START taking these medications    Details   acetaminophen (TYLENOL) 325 MG tablet Take 2 tablets (650 mg) by mouth 3 times daily.    Associated Diagnoses: Closed fracture of proximal end of right humerus, unspecified fracture morphology, initial encounter      oxyCODONE (ROXICODONE) 5 MG tablet Take 1 tablet (5 mg) by mouth every 4 hours as needed for moderate pain.  Qty: 20 tablet, Refills: 0    Associated Diagnoses: Closed fracture of proximal end of right humerus, unspecified fracture morphology, initial encounter      senna-docusate (SENOKOT-S/PERICOLACE) 8.6-50 MG tablet Take 1 tablet by mouth 2 times daily as needed for constipation.    Associated Diagnoses: Drug-induced constipation           CONTINUE these medications which have NOT CHANGED    Details   atorvastatin (LIPITOR) 20 MG tablet Take 20 mg by mouth every evening.      donepezil (ARICEPT) 23 MG tablet Take 1 tablet by mouth at bedtime.      estradiol (ESTRACE) 0.1 MG/GM vaginal cream Place vaginally three times a week.      FLUoxetine (PROZAC) 40 MG capsule Take 40 mg by mouth daily.      ipratropium (ATROVENT) 0.03 % nasal spray Spray 2 sprays into both nostrils 3 times daily.      LORazepam (ATIVAN) 0.5 MG tablet Take 0.5 mg by mouth nightly as needed for anxiety.       multivitamin w/minerals (THERA-VIT-M) tablet Take 1 tablet by mouth daily.      tolterodine ER (DETROL LA) 4 MG 24 hr capsule Take 4 mg by mouth daily.           Allergies   Allergies   Allergen Reactions    Imipramine Unknown    Ofloxacin Unknown    Penicillins Unknown    Quinolones Unknown    Tricyclic Antidepressants Unknown    Bactrim [Sulfamethoxazole-Trimethoprim] Rash     Data   Most Recent 3 CBC's:  Recent Labs   Lab Test 01/20/25  0707 01/19/25  0711 09/08/22  1536   WBC 7.1 8.7 5.7   HGB 9.5* 10.1* 9.9*   MCV 96 95 93   PLT 83* 82* 81*      Most Recent 3 BMP's:  Recent Labs   Lab Test 01/20/25  0707 01/19/25  0711 11/01/24  1047    142 141   POTASSIUM 3.9 3.5 3.7   CHLORIDE 104 104 104   CO2 25 25 27   BUN 17.0 19.2 15.7   CR 0.79 0.78 0.97*   ANIONGAP 11 13 10   RAMOS 9.6 9.7 9.2   * 154* 123*     Most Recent 2 LFT's:  Recent Labs   Lab Test 11/01/24  1047 01/18/24  1538   AST 34 28   ALT 41 20   ALKPHOS 79 80   BILITOTAL 0.9 0.4     Most Recent INR's and Anticoagulation Dosing History:  Anticoagulation Dose History           No data to display              Most Recent 3 Troponin's:No lab results found.  Most Recent Cholesterol Panel:  Recent Labs   Lab Test 11/01/24  1047   CHOL 124   LDL 58   HDL 50   TRIG 80     Most Recent 6 Bacteria Isolates From Any Culture (See EPIC Reports for Culture Details):No lab results found.  Most Recent TSH, T4 and A1c Labs:  Recent Labs   Lab Test 09/08/22  1536 08/25/20  1154   TSH  --  1.95   A1C 5.4  --      Results for orders placed or performed during the hospital encounter of 01/19/25   Humerus XR, G/E 2 views, right    Narrative    EXAM: RIGHT SHOULDER AND HUMERUS 4 VIEWS  LOCATION: [facility]  DATE/TIME: 01/19/2025, 5:40 AM CST    INDICATION: Fall. Pain.  COMPARISON: None.      Impression    IMPRESSION:   1.  An apparent subtle linear lucency within the cortex of the lateral aspect of the right humeral neck and apparent  foreshortening of the humeral neck, suspicious for a mildly impacted fracture that could be acute or subacute. If clinically relevant, a   CT or MR of the shoulder could be considered for confirmation of a recent fracture.  2.  No other findings suspicious for acute fracture or malalignment of the right shoulder or humerus.     XR Shoulder Right 2 Views    Narrative    EXAM: RIGHT SHOULDER AND HUMERUS 4 VIEWS  LOCATION: [facility]  DATE/TIME: 01/19/2025, 5:40 AM CST    INDICATION: Fall. Pain.  COMPARISON: None.      Impression    IMPRESSION:   1.  An apparent subtle linear lucency within the cortex of the lateral aspect of the right humeral neck and apparent foreshortening of the humeral neck, suspicious for a mildly impacted fracture that could be acute or subacute. If clinically relevant, a   CT or MR of the shoulder could be considered for confirmation of a recent fracture.  2.  No other findings suspicious for acute fracture or malalignment of the right shoulder or humerus.     CT Shoulder Right w/o Contrast    Narrative    EXAM: CT SHOULDER RIGHT WITHOUT CONTRAST  LOCATION: [facility]  DATE: 01/19/2025    INDICATION: Pain. Fall with injury. Lucency seen on XR, needing CT for further clarification of possibly fracture.  COMPARISON:   1. X-ray right humerus 2 views 01/19/2025 at 0523 hours.  2. X-ray right shoulder 2 views 01/19/2025 at 0524 hours.  TECHNIQUE: Noncontrast. Axial, sagittal and coronal thin-section reconstruction. Dose reduction techniques were used.     FINDINGS:     BONES:  -Impacted fracture involving the surgical neck of the proximal right humerus. Subtle sclerosis along the fracture lines suggests a subacute fracture. Demineralization of the visualized bones. Mild degenerative changes right shoulder. Distal right rotator   cuff calcific arthropathy.    SOFT TISSUES:  -Mild soft tissue swelling and bruising in the anterior subcutaneous fat of  the right upper extremity extending inferiorly. No discrete soft tissue hematoma or fluid collection. Trace scarring in the right lung apex. Right thyroid lobe hypodense nodule   can be better evaluated with dedicated ultrasound.      Impression    IMPRESSION:  1.  Impacted fracture involving the surgical neck of the proximal right humerus. Subtle sclerosis along the fracture lines suggests a subacute fracture. Degenerative changes right shoulder. Distal right rotator cuff calcific arthropathy.    2.  Superficial soft tissue swelling and bruising anteriorly.    3.  Right thyroid lobe hypodense nodule can be better evaluated with dedicated ultrasound.     CT Head w/o Contrast    Narrative    EXAM: CT HEAD W/O CONTRAST  LOCATION: [facility]  DATE: 1/19/2025    INDICATION: fall, low plt count, blunt head injury  COMPARISON:  1/11/2023.  TECHNIQUE: Routine CT Head without IV contrast. Multiplanar reformats. Dose reduction techniques were used.    FINDINGS:  INTRACRANIAL CONTENTS: No intracranial hemorrhage, extraaxial collection, or mass effect.  No CT evidence of acute infarct. Mild presumed chronic small vessel ischemic changes. Mild generalized volume loss. No hydrocephalus.     VISUALIZED ORBITS/SINUSES/MASTOIDS: No intraorbital abnormality. No paranasal sinus mucosal disease. No middle ear or mastoid effusion.    BONES/SOFT TISSUES: No acute abnormality.      Impression    IMPRESSION:  1.  No CT evidence for acute intracranial process.  2.  Brain atrophy and presumed chronic microvascular ischemic changes as above.   CT Cervical Spine w/o Contrast    Narrative    EXAM: CT CERVICAL SPINE W/O CONTRAST  LOCATION: [facility]  DATE: 1/19/2025    INDICATION: fall, neck pain,, blunt head injury;   COMPARISON: None.  TECHNIQUE: Routine CT Cervical Spine without IV contrast. Multiplanar reformats. Dose reduction techniques were used.    FINDINGS:  VERTEBRAE: Vertebral body  heights maintained. No subluxation. No acute fracture. Diffuse osseous demineralization. Mild degenerative findings throughout the cervical spine with facet arthropathy, vertebral body osteophyte formation, and disc height loss.    CANAL: No osseous narrowing of the spinal canal.    PARASPINAL: Paraspinal soft tissue within normal limits.      Impression    IMPRESSION:  1.  No acute traumatic abnormality within the cervical spine.

## 2025-01-20 NOTE — PROGRESS NOTES
[facility]    Medicine Progress Note - Hospitalist Service    Date of Admission:  2025    Assessment & Plan   Bushra Mccoy is a 84 year old female with a pertinent history of Alzheimer's disease and overactive urinary bladder, who presents to this ED via ambulance for evaluation of right shoulder injury following a fall.      Mechanical fall on , hit right side of shoulder/neck  -Unsure if she had LOC, no chest pain prior, no neuro weakness  -CT head: no acute change. Brain atrophy and presumed chronic microvascular ischemic changes   -C-spine: no acute change  -cardiac monitoring, check EKG   -PT OT evaluation  -Falls precaution  -pain control    Right humerus neck fracture  -No neurovascular deficits on exam  -Xrays show an apparent subtle linear lucency within the cortex of the lateral aspect of the right humeral neck and apparent foreshortening of the humeral neck,   -CT shows impacted fracture involving the surgical neck of the proximal right humerus.   -Orthopedics consulted: Sling. Nonweightbearing right upper extremity. Follow up within 2 w.      Alzheimer's dementia  -Continue home meds, no agitation currently    Overactive bladder  Continue home meds, monitor for retention    Chronic anemia/low platelets   low-grade B-cell lymphoma  Follows with oncology  Had a bone marrow biopsy   Bone marrow study shows low-grade B-cell lymphoma  CBC today shows low platelet, Hb stable  Follow oncology clinic, follow CBC    Incidental finding  Shoulder CT shows incidental thyroid nodule, follow-up in PCP    Postnasal drip  Requesting artificial tears  Also requesting Claritin p.o.    Ambulatory prior to this admission, recently moved to independent living facility,   recently  Quite active exercise tolerance prior to this episode, denies any cardiac renal disease.       Diet: Regular Diet Adult    DVT Prophylaxis: Pneumatic Compression Devices  Soliz Catheter: Not  present  Lines: None     Cardiac Monitoring: ACTIVE order. Indication: Tachyarrhythmias, acute (48 hours)  Code Status: Full Code      Clinically Significant Risk Factors                 # Thrombocytopenia: Lowest platelets = 82 in last 2 days, will monitor for bleeding                  # Financial/Environmental Concerns: none         Social Drivers of Health            Disposition Plan     Medically Ready for Discharge: Anticipated Tomorrow    Pt/ot/sw for placement         Nilo Burleson MD  Hospitalist Service  [facility]  Securely message with [employer] (more info)  Text page via [employer] Paging/Directory   ______________________________________________________________________    Interval History   Right shoulder and arm pain, unable to use; difficulty with feeding herself. No cp/sob, no f/c, no n/v.     Physical Exam   Vital Signs: Temp: 97.6  F (36.4  C) Temp src: Oral BP: (!) 176/75 Pulse: 75   Resp: 17 SpO2: 98 % O2 Device: None (Room air)    Weight: 110 lbs 0 oz    General.  Awake alert oriented not in acute distress.  HEENT.  Pupils equal round react to light, anicteric, EOM intact.  Neck supple no JVD.  CVS regular rhythm no murmur gallops.  Lungs.  Clear to auscultation bilateral no wheezing or rales.  Abdomen.  Soft nontender bowel sounds present.  Extremities.  Right shoulder ecchymosis, arm in sling.  Neurological.  Awake and alert. No focal deficit.  Skin no rash. No pallor.  Psych.impaired memory    Medical Decision Making       47 MINUTES SPENT BY ME on the date of service doing chart review, history, exam, documentation & further activities per the note.      Data     I have personally reviewed the following data over the past 24 hrs:    7.1  \   9.5 (L)   / 83 (L)     140 104 17.0 /  118 (H)   3.9 25 0.79 \       Imaging results reviewed over the past 24 hrs:   Recent Results (from the past 24 hours)   CT Head w/o Contrast    Narrative    EXAM: CT HEAD W/O CONTRAST  LOCATION: [facility]  DATE: 1/19/2025    INDICATION: fall, low plt count, blunt head injury  COMPARISON:  1/11/2023.  TECHNIQUE: Routine CT Head without IV contrast. Multiplanar reformats. Dose reduction techniques were used.    FINDINGS:  INTRACRANIAL CONTENTS: No intracranial hemorrhage, extraaxial collection, or mass effect.  No CT evidence of acute infarct. Mild presumed chronic small vessel ischemic changes. Mild generalized volume loss. No hydrocephalus.     VISUALIZED ORBITS/SINUSES/MASTOIDS: No intraorbital abnormality. No paranasal sinus mucosal disease. No middle ear or mastoid effusion.    BONES/SOFT TISSUES: No acute abnormality.      Impression    IMPRESSION:  1.  No CT evidence for acute intracranial process.  2.  Brain atrophy and presumed chronic microvascular ischemic changes as above.   CT Cervical Spine w/o Contrast    Narrative    EXAM: CT CERVICAL SPINE W/O CONTRAST  LOCATION: [facility]  DATE: 1/19/2025    INDICATION: fall, neck pain,, blunt head injury;   COMPARISON: None.  TECHNIQUE: Routine CT Cervical Spine without IV contrast. Multiplanar reformats. Dose reduction techniques were used.    FINDINGS:  VERTEBRAE: Vertebral body heights maintained. No subluxation. No acute fracture. Diffuse osseous demineralization. Mild degenerative findings throughout the cervical spine with facet arthropathy, vertebral body osteophyte formation, and disc height loss.    CANAL: No osseous narrowing of the spinal canal.    PARASPINAL: Paraspinal soft tissue within normal limits.      Impression    IMPRESSION:  1.  No acute traumatic abnormality within the cervical spine.

## 2025-01-20 NOTE — PLAN OF CARE
Problem: Pain Acute  Goal: Optimal Pain Control and Function  Outcome: Progressing     Problem: Confusion Acute  Goal: Optimal Cognitive Function  Outcome: Progressing   Goal Outcome Evaluation:  VSS overnight.  CMS and neuro checks consistent.  Intermittent confusion and fixated on needing to go home.  Pain treated with PRN oxy one time around 2000.

## 2025-01-20 NOTE — PLAN OF CARE
Problem: Adult Inpatient Plan of Care  Goal: Optimal Comfort and Wellbeing  Outcome: Adequate for Care Transition     Problem: Adult Inpatient Plan of Care  Goal: Readiness for Transition of Care  Outcome: Adequate for Care Transition     Problem: Pain Acute  Goal: Optimal Pain Control and Function  Outcome: Adequate for Care Transition  Intervention: Prevent or Manage Pain  Recent Flowsheet Documentation  Taken 1/20/2025 0950 by Cameron Ramos, RN  Medication Review/Management: medications reviewed   Goal Outcome Evaluation:  Pain managed with PRN acetaminophen. Pt is oriented to self and place. Intermittent confusion noted. Pt to discharge to [facility] after 1600 today. Vital signs are stable. Right arm immobilized in a sling. No other concerns noted.  Cameron Ramos, RN  1/20/2025  3:13 PM

## 2025-01-20 NOTE — PROGRESS NOTES
[facility] GERIATRIC SERVICES    Code Status:  FULL CODE   Visit Type:   Chief Complaint   Patient presents with    TCU Admission     [facility] 1/19/2025 - 1/20/2025     Facility:  [facility] ([facility]) [[id number]]         HPI: Bushra Mccoy is a 84 year old female I am seeing today for admit to the TCU.  Past medical history includes Alzheimer's dementia, overactive bladder and chronic anemia with low platelets. Patient with recent fall sustaining a right humerus fracture.  He was seen by Ortho and splinted.  Pain control with Tylenol.  Imaging done at the ER found incidentally a thyroid nodule.  CT showed right thyroid lobe hypodense nodule.  Suggested follow-up ultrasound.    Transitional Care Course: On today's visit patient is sitting up in wheelchair.  She does have underlying cognitive impairment and somewhat of a poor historian.  She continues in splinting.  Positive CMS.  Pain control with Tylenol.  She denies any shortness of breath or chest pain.  No dizziness or headache.  Blood pressure appears on the soft side.  Nursing staff reported orthostatic hypotension with systolic BP down in his 60s with orthostatic check.  Suspect volume loss due to poor oral intake.  Fluids will be encouraged.      Assessment/Plan:     Right humerus neck fracture  -Xrays show an apparent subtle linear lucency within the cortex of the lateral aspect of the right humeral neck and apparent foreshortening of the humeral neck,   -CT shows impacted fracture involving the surgical neck of the proximal right humerus.   -Orthopedics consult patient placed in sling.  Positive CMS.  -Nonweightbearing right upper extremity.  -Tylenol 650 3 times daily.  -Oxycodone 5 mg every 4 hours as needed.  Will attempt to wean off for limit use secondary to underlying cognitive impairment.  -Follow-up with Ortho in 2 weeks.     Alzheimer's dementia  Depression with anxiety   -Donepezil 23 mg nightly.  -Fluoxetine 40 mg every morning. (This  could be contributory to orthostatic hypotension.) Monitor.    Overactive bladder  -tolterodine 4 mg Q AM.     Orthostatic Hypotension  ~ 20 pt drop in bp with Orthostatic bp checks. Pt asymptomatic.   -Fluoxetine may be contributory as well as poor oral intake.   -Give 240 ml of H2O with each med pass QID.   -TEDS on am/off HS for bp support.      Chronic anemia/low platelets   Low-grade B Cell lymphoma  -Had a bone marrow biopsy 2023  -Bone marrow study shows low-grade B-cell lymphoma  -Follow up CBC.   -Follows with  oncology clinic     Thyroid nodule   -Incidentally found on CT.   -Follow-up out pt with PCP for US.     -Ok for PT, OT to eval and treat.   -Follow up CBC and BMP.     Active Ambulatory Problems     Diagnosis Date Noted    Thyroid nodule 01/19/2025    Fall at home, initial encounter 01/19/2025    Closed fracture of proximal end of right humerus, unspecified fracture morphology, initial encounter 01/19/2025    Drug-induced constipation 01/20/2025    Acute vaginitis 09/26/2023    Generalized anxiety disorder 04/24/2023    Alzheimer's disease (H) 09/26/2023    Overactive bladder 09/26/2023    Diverticular disease of colon 11/07/2012    Diverticular disease of large intestine 12/07/2021    History of colonic polyps 12/09/2021    Major depressive disorder, recurrent, in remission, unspecified 04/24/2023    Polyp of colon 12/07/2021    Seasonal allergies 04/24/2023     Resolved Ambulatory Problems     Diagnosis Date Noted    No Resolved Ambulatory Problems     No Additional Past Medical History     Allergies   Allergen Reactions    Bactrim [Sulfamethoxazole-Trimethoprim] Rash    Imipramine Unknown    Ofloxacin Unknown    Penicillins Unknown    Quinolones Unknown    Sulfa Antibiotics Rash    Tricyclic Antidepressants Unknown       All Meds and Allergies reviewed in the record at the facility and is the most up-to-date.    Post Discharge Medication Reconciliation Status: discharge medications reconciled,  "continue medications without change  Current Outpatient Medications   Medication Sig Dispense Refill    acetaminophen (TYLENOL) 325 MG tablet Take 2 tablets (650 mg) by mouth 3 times daily.      atorvastatin (LIPITOR) 20 MG tablet Take 20 mg by mouth every evening.      donepezil (ARICEPT) 23 MG tablet Take 1 tablet by mouth at bedtime.      estradiol (ESTRACE) 0.1 MG/GM vaginal cream Place vaginally three times a week.      FLUoxetine (PROZAC) 40 MG capsule Take 40 mg by mouth daily.      ipratropium (ATROVENT) 0.03 % nasal spray Spray 2 sprays into both nostrils 3 times daily.      LORazepam (ATIVAN) 0.5 MG tablet Take 0.5 mg by mouth nightly as needed for anxiety.      multivitamin w/minerals (THERA-VIT-M) tablet Take 1 tablet by mouth daily.      oxyCODONE (ROXICODONE) 5 MG tablet Take 1 tablet (5 mg) by mouth every 4 hours as needed for moderate pain. 20 tablet 0    senna-docusate (SENOKOT-S/PERICOLACE) 8.6-50 MG tablet Take 1 tablet by mouth 2 times daily as needed for constipation.      tolterodine ER (DETROL LA) 4 MG 24 hr capsule Take 4 mg by mouth daily.       No current facility-administered medications for this visit.       REVIEW OF SYSTEMS:   10 point review of systems reviewed and pertinent positives in the HPI.     PHYSICAL EXAMINATION:  Physical Exam     Vital signs: BP (!) 158/71   Pulse 85   Temp 97.3  F (36.3  C)   Resp 16   Ht 1.473 m (4' 10\")   Wt 46.3 kg (102 lb)   SpO2 99%   BMI 21.32 kg/m    General: Awake, Alert, oriented x3, sitting up in wheelchair, appropriately, follows simple commands, conversant  HEENT:Pink conjunctiva, moist oral mucosa  NECK: Supple  CVS:  S1  S2, without murmur or gallop.   LUNG: Clear to auscultation, No wheezes, rales or rhonci.  BACK: No kyphosis of the thoracic spine  ABDOMEN: Soft, nontender to palpation, with positive bowel sounds  EXTREMITIES: moves both upper and lower with limitation to RUE, RUE in sling + CMS, no pedal edema, no calf " tenderness  SKIN: Warm and dry  NEUROLOGIC: Intact, pulses palpable  PSYCHIATRIC: Cognitive impairment noted however pt answering questions appropriately.       Labs:  All labs reviewed in the nursing home record and Epic   @  Lab Results   Component Value Date    WBC 7.1 01/20/2025     Lab Results   Component Value Date    RBC 2.98 01/20/2025     Lab Results   Component Value Date    HGB 9.5 01/20/2025     Lab Results   Component Value Date    HCT 28.7 01/20/2025     Lab Results   Component Value Date    MCV 96 01/20/2025     Lab Results   Component Value Date    MCH 31.9 01/20/2025     Lab Results   Component Value Date    MCHC 33.1 01/20/2025     Lab Results   Component Value Date    RDW 12.6 01/20/2025     Lab Results   Component Value Date    PLT 83 01/20/2025        @Last Comprehensive Metabolic Panel:  Sodium   Date Value Ref Range Status   01/20/2025 140 135 - 145 mmol/L Final     Potassium   Date Value Ref Range Status   01/20/2025 3.9 3.4 - 5.3 mmol/L Final   10/22/2021 3.9 3.5 - 5.0 mmol/L Final     Chloride   Date Value Ref Range Status   01/20/2025 104 98 - 107 mmol/L Final   10/22/2021 110 (H) 98 - 107 mmol/L Final     Carbon Dioxide (CO2)   Date Value Ref Range Status   01/20/2025 25 22 - 29 mmol/L Final   10/22/2021 26 22 - 31 mmol/L Final     Anion Gap   Date Value Ref Range Status   01/20/2025 11 7 - 15 mmol/L Final   10/22/2021 9 5 - 18 mmol/L Final     Glucose   Date Value Ref Range Status   01/20/2025 118 (H) 70 - 99 mg/dL Final   10/22/2021 103 70 - 125 mg/dL Final     Urea Nitrogen   Date Value Ref Range Status   01/20/2025 17.0 8.0 - 23.0 mg/dL Final   10/22/2021 16 8 - 28 mg/dL Final     Creatinine   Date Value Ref Range Status   01/20/2025 0.79 0.51 - 0.95 mg/dL Final     GFR Estimate   Date Value Ref Range Status   01/20/2025 74 >60 mL/min/1.73m2 Final     Comment:     eGFR calculated using 2021 CKD-EPI equation.   08/25/2020 59 (L) >60 mL/min/1.73m2 Final     Calcium   Date Value Ref  Range Status   01/20/2025 9.6 8.8 - 10.4 mg/dL Final     Comment:     Reference intervals for this test were updated on 7/16/2024 to reflect our healthy population more accurately. There may be differences in the flagging of prior results with similar values performed with this method. Those prior results can be interpreted in the context of the updated reference intervals.       > 45 minutes spent preparing for this visit which included reviewing hospital records, labs, imaging, meds, consults as well as face to face time with pt and collaborating with nursing.       This note has been dictated using voice recognition software. Any grammatical or context distortions are unintentional and inherent to the software    Electronically signed by: Lizette Carbajal CNP

## 2025-01-20 NOTE — PLAN OF CARE
Physical Therapy Discharge Summary    Reason for therapy discharge:    Discharged to transitional care facility.    Progress towards therapy goal(s). See goals on Care Plan in [facility] electronic health record for goal details.  Goals not met.  Barriers to achieving goals:   discharge from facility.    Therapy recommendation(s):    Continued therapy is recommended.  Rationale/Recommendations:  at TCU.

## 2025-01-20 NOTE — PLAN OF CARE
Occupational Therapy Discharge Summary    Reason for therapy discharge:    Discharged to transitional care facility.    Progress towards therapy goal(s). See goals on Care Plan in [facility] electronic health record for goal details.  Goals not met.  Barriers to achieving goals:   discharge from facility.    Therapy recommendation(s):  pt is going to TCU for further therapy

## 2025-01-20 NOTE — PROGRESS NOTES
Accepted at [facility]CU today. MD updated and has placed orders. LYDIA left  with daughter Sonia ([phone])- waiting for callback confirmation about transportation.     Callback from Daughter Sonia. Agrees with discharge today. Her sister Emily Gregory will be here around noon. Parties agreed to 2 PM discharge with Dtr Emily transporting patient to facility.       Plan for discharge to [facility] TCU today at around 2 PM. Dtr Emily transporting.       [id number] completed.      Updated by facility that they wanted a later discharge. LYDIA spoke with Dtr Sonia again. She will be in hospital to transport pt between 4/430 PM.

## 2025-01-20 NOTE — PROGRESS NOTES
"Pt discharging to [facility] TCU. Daughters here to transport pt.  VSS, AVS gone over w/ both daughters, script given for Oxycodone and instructions.  PIV removed and tele- taken off.    /56 (BP Location: Left arm, Patient Position: Sitting)   Pulse 79   Temp 98.2  F (36.8  C) (Oral)   Resp 17   Ht 1.473 m (4' 10\")   Wt 49.9 kg (110 lb)   SpO2 100%   BMI 22.99 kg/m      "

## 2025-01-21 ENCOUNTER — LAB REQUISITION (OUTPATIENT)
Dept: LAB | Facility: CLINIC | Age: 85
End: 2025-01-21
Payer: COMMERCIAL

## 2025-01-21 ENCOUNTER — TRANSITIONAL CARE UNIT VISIT (OUTPATIENT)
Dept: GERIATRICS | Facility: CLINIC | Age: 85
End: 2025-01-21
Payer: COMMERCIAL

## 2025-01-21 VITALS
OXYGEN SATURATION: 99 % | BODY MASS INDEX: 21.41 KG/M2 | RESPIRATION RATE: 16 BRPM | WEIGHT: 102 LBS | HEIGHT: 58 IN | HEART RATE: 85 BPM | SYSTOLIC BLOOD PRESSURE: 158 MMHG | TEMPERATURE: 97.3 F | DIASTOLIC BLOOD PRESSURE: 71 MMHG

## 2025-01-21 DIAGNOSIS — S42.201D CLOSED FRACTURE OF PROXIMAL END OF RIGHT HUMERUS WITH ROUTINE HEALING, UNSPECIFIED FRACTURE MORPHOLOGY, SUBSEQUENT ENCOUNTER: ICD-10-CM

## 2025-01-21 DIAGNOSIS — G30.9 ALZHEIMER'S DISEASE (H): Primary | ICD-10-CM

## 2025-01-21 DIAGNOSIS — F41.1 GENERALIZED ANXIETY DISORDER: ICD-10-CM

## 2025-01-21 DIAGNOSIS — I95.1 ORTHOSTATIC HYPOTENSION: ICD-10-CM

## 2025-01-21 DIAGNOSIS — D63.8 ANEMIA IN OTHER CHRONIC DISEASES CLASSIFIED ELSEWHERE: ICD-10-CM

## 2025-01-21 DIAGNOSIS — F02.80 ALZHEIMER'S DISEASE (H): Primary | ICD-10-CM

## 2025-01-21 DIAGNOSIS — E04.1 THYROID NODULE: ICD-10-CM

## 2025-01-21 DIAGNOSIS — F33.40 MAJOR DEPRESSIVE DISORDER, RECURRENT, IN REMISSION, UNSPECIFIED: ICD-10-CM

## 2025-01-21 DIAGNOSIS — N32.81 OVERACTIVE BLADDER: ICD-10-CM

## 2025-01-21 DIAGNOSIS — C83.00 SMALL B-CELL LYMPHOMA, UNSPECIFIED BODY REGION (H): ICD-10-CM

## 2025-01-21 PROCEDURE — 99310 SBSQ NF CARE HIGH MDM 45: CPT | Performed by: NURSE PRACTITIONER

## 2025-01-21 NOTE — PROGRESS NOTES
[facility]                                                                                   OUTPATIENT OCCUPATIONAL THERAPY    PLAN OF TREATMENT FOR OUTPATIENT REHABILITATION   Patient's Last Name, First Name, Bushra Dee YOB: 1940   Provider's Name   [facility]   Medical Record No.  [id number]     Onset Date: 01/19/25 Start of Care Date: 01/21/25     Medical Diagnosis:  humerus fx               OT Diagnosis:  R humerus fx Certification Dates:  From: 01/21/25  To: 01/27/25     See note for plan of treatment, functional goals, and certification details.    I CERTIFY THE NEED FOR THESE SERVICES FURNISHED UNDER        THIS PLAN OF TREATMENT AND WHILE UNDER MY CARE (Physician co-signature of this document indicates review and certification of the therapy plan).                               01/20/25 1001   Appointment Info   Signing Clinician's Name / Credentials (OT) Marnie Bueno OT   Quick Adds   Quick Adds Certification   Living Environment   People in Home alone   Current Living Arrangements independent living facility   Home Accessibility no concerns   Transportation Anticipated family or friend will provide   Living Environment Comments was independent w\her ADls and mobility   Self-Care   Usual Activity Tolerance good   Current Activity Tolerance fair   Equipment Currently Used at Home cane, straight;grab bar, toilet;grab bar, tub/shower;raised toilet seat;shower chair   Fall history within last six months yes   Number of times patient has fallen within last six months 1   Instrumental Activities of Daily Living (IADL)   Previous Responsibilities meal prep;housekeeping;laundry;finances;medication management  (family shop/drive)   General Information   Onset of Illness/Injury or Date of Surgery 01/19/25   Referring Physician Dr Burleson   Patient/Family Therapy Goal Statement (OT) go to TCU   Additional Occupational Profile  Info/Pertinent History of Current Problem Bushra Mccoy is a 84 year old female with a pertinent history of Alzheimer's disease and overactive urinary bladder, who presents to this ED via ambulance for evaluation of right shoulder injury following a fall.   Existing Precautions/Restrictions fall;weight bearing;other (see comments)  (NWB RUE, sling RUE at all times)   Left Upper Extremity (Weight-bearing Status) full weight-bearing (FWB)   Right Upper Extremity (Weight-bearing Status) non weight-bearing (NWB)   Left Lower Extremity (Weight-bearing Status) full weight-bearing (FWB)   Right Lower Extremity (Weight-bearing Status) full weight-bearing (FWB)   Cognitive Status Examination   Orientation Status person;place   Follows Commands follows multi-step commands;50-74% accuracy   Visual Perception   Visual Impairment/Limitations corrective lenses full-time   Sensory   Sensory Quick Adds sensation intact   Pain Assessment   Patient Currently in Pain Yes, see Vital Sign flowsheet  (RUE)   Posture   Posture forward head position   Range of Motion Comprehensive   General Range of Motion upper extremity range of motion deficits identified   Comment, General Range of Motion due to fx humerus RUE   General Upper Extremity Assessment (Range of Motion)   Upper Extremity: Range of Motion LUE ROM WNL   Strength Comprehensive (MMT)   General Manual Muscle Testing (MMT) Assessment upper extremity strength deficits identified   Comment, General Manual Muscle Testing (MMT) Assessment due to RUE humerus fx   Upper Extremity (Manual Muscle Testing)   Upper Extremity: Manual Muscle Testing (MMT) left UE strength is WNL;left shoulder strength deficit   Comment, MMT: Upper Extremity RUE deficit due to humerus fx   Muscle Tone Assessment   Muscle Tone Quick Adds No deficits were identified   Coordination   Upper Extremity Coordination Right UE impaired  (due to swelling of RUE secondary to humerus fx)   Bed Mobility   Bed Mobility  supine-sit;sit-supine   Supine-Sit [place] (Bed Mobility) maximum assist (25% patient effort)   Sit-Supine [place] (Bed Mobility) maximum assist (25% patient effort)   Transfers   Transfers sit-stand transfer   Sit-Stand Transfer   Sit-Stand [place] (Transfers) moderate assist (50% patient effort)   Sit/Stand Transfer Comments w\ hand hold assist   Balance   Balance Assessment sit to stand dynamic balance   Sit-to-Stand Balance moderate assist;1-person assist   Activities of Daily Living   BADL Assessment/Intervention lower body dressing   Lower Body Dressing Assessment/Training   Comment, (Lower Body Dressing) due to decreased 1 hand use   [place] Level (Lower Body Dressing) maximum assist (25% patient effort)   Clinical Impression   Criteria for Skilled Therapeutic Interventions Met (OT) Yes, treatment indicated   OT Diagnosis R humerus fx   Influenced by the following impairments fatigue, decreased ADLs   OT Problem List-Impairments impacting ADL activity tolerance impaired;balance;communication   Assessment of Occupational Performance 3-5 Performance Deficits   Identified Performance Deficits fatigue, decreased ADLs.balance, cognition, fatigue   Planned Therapy Interventions (OT) ADL retraining;cognition   Clinical Decision Making Complexity (OT) detailed assessment/moderate complexity   Risk & Benefits of therapy have been explained evaluation/treatment results reviewed;care plan/treatment goals reviewed;risks/benefits reviewed;participants voiced agreement with care plan   OT Total Evaluation Time   OT Eval, Moderate Complexity Minutes (02191) 10   Therapy Certification   Medical Diagnosis humerus fx   Start of Care Date 01/21/25   Certification date from 01/21/25   Certification date to 01/27/25   OT Goals   Therapy Frequency (OT) 5 times/week   OT Predicted Duration/Target Date for Goal Attainment 01/26/25   OT Goals Hygiene/Grooming;Upper Body Dressing;Lower Body Dressing;Toilet  Transfer/Toileting;Cognition   OT: Hygiene/Grooming supervision/stand-by assist   OT: Upper Body Dressing Supervision/stand-by assist   OT: Lower Body Dressing Supervision/stand-by assist   OT: Toilet Transfer/Toileting Supervision/stand-by assist   OT: Cognitive Patient/caregiver will verbalize understanding of cognitive assessment results/recommendations as needed for safe discharge planning   Interventions   Interventions Quick Adds Self-Care/Home Management   Self-Care/Home Management   Self-Care/Home Mgmt/ADL, Compensatory, Meal Prep Minutes (75986) 10   Symptoms Noted During/After Treatment (Meal Preparation/Planning Training) fatigue   Treatment Detail/Skilled Intervention transfers Mod A of 1 pt took 2 steps, G/H mod A due to decreased 1 hand use, UB/LB dress max A due to decreased 1 hand use.   OT Discharge Planning   OT Plan transfers/toileting, G/H, TB dress, sling to RUE   OT Discharge Recommendation (DC Rec) Transitional Care Facility   OT Rationale for DC Rec tpt has a decline in her ADLs and mobility needing a TCU to increase skills to PLOF   OT Brief overview of current status transfer Mod A, ADLs Max A   Total Session Time   Timed Code Treatment Minutes 10   Total Session Time (sum of timed and untimed services) 20

## 2025-01-21 NOTE — PROGRESS NOTES
[facility]                                                                                   OUTPATIENT OCCUPATIONAL THERAPY    PLAN OF TREATMENT FOR OUTPATIENT REHABILITATION   Patient's Last Name, First Name, Bushra Dee YOB: 1940   Provider's Name   [facility]   Medical Record No.  [id number]     Onset Date: 01/19/25 Start of Care Date: (P) 01/20/25     Medical Diagnosis:  humerus fx               OT Diagnosis:  R humerus fx Certification Dates:  From: 01/21/25  To: 01/27/25     See note for plan of treatment, functional goals, and certification details.    I CERTIFY THE NEED FOR THESE SERVICES FURNISHED UNDER        THIS PLAN OF TREATMENT AND WHILE UNDER MY CARE (Physician co-signature of this document indicates review and certification of the therapy plan).                               01/20/25 1001   Appointment Info   Signing Clinician's Name / Credentials (OT) Marnie Bueno OT   Quick Adds   Quick Adds Certification   Living Environment   People in Home alone   Current Living Arrangements independent living facility   Home Accessibility no concerns   Transportation Anticipated family or friend will provide   Living Environment Comments was independent w\her ADls and mobility   Self-Care   Usual Activity Tolerance good   Current Activity Tolerance fair   Equipment Currently Used at Home cane, straight;grab bar, toilet;grab bar, tub/shower;raised toilet seat;shower chair   Fall history within last six months yes   Number of times patient has fallen within last six months 1   Instrumental Activities of Daily Living (IADL)   Previous Responsibilities meal prep;housekeeping;laundry;finances;medication management  (family shop/drive)   General Information   Onset of Illness/Injury or Date of Surgery 01/19/25   Referring Physician Dr Burleson   Patient/Family Therapy Goal Statement (OT) go to TCU   Additional Occupational  Profile Info/Pertinent History of Current Problem Bushra Mccoy is a 84 year old female with a pertinent history of Alzheimer's disease and overactive urinary bladder, who presents to this ED via ambulance for evaluation of right shoulder injury following a fall.   Existing Precautions/Restrictions fall;weight bearing;other (see comments)  (NWB RUE, sling RUE at all times)   Left Upper Extremity (Weight-bearing Status) full weight-bearing (FWB)   Right Upper Extremity (Weight-bearing Status) non weight-bearing (NWB)   Left Lower Extremity (Weight-bearing Status) full weight-bearing (FWB)   Right Lower Extremity (Weight-bearing Status) full weight-bearing (FWB)   Cognitive Status Examination   Orientation Status person;place   Follows Commands follows multi-step commands;50-74% accuracy   Visual Perception   Visual Impairment/Limitations corrective lenses full-time   Sensory   Sensory Quick Adds sensation intact   Pain Assessment   Patient Currently in Pain Yes, see Vital Sign flowsheet  (RUE)   Posture   Posture forward head position   Range of Motion Comprehensive   General Range of Motion upper extremity range of motion deficits identified   Comment, General Range of Motion due to fx humerus RUE   General Upper Extremity Assessment (Range of Motion)   Upper Extremity: Range of Motion LUE ROM WNL   Strength Comprehensive (MMT)   General Manual Muscle Testing (MMT) Assessment upper extremity strength deficits identified   Comment, General Manual Muscle Testing (MMT) Assessment due to RUE humerus fx   Upper Extremity (Manual Muscle Testing)   Upper Extremity: Manual Muscle Testing (MMT) left UE strength is WNL;left shoulder strength deficit   Comment, MMT: Upper Extremity RUE deficit due to humerus fx   Muscle Tone Assessment   Muscle Tone Quick Adds No deficits were identified   Coordination   Upper Extremity Coordination Right UE impaired  (due to swelling of RUE secondary to humerus fx)   Bed Mobility   Bed  Mobility supine-sit;sit-supine   Supine-Sit [place] (Bed Mobility) maximum assist (25% patient effort)   Sit-Supine [place] (Bed Mobility) maximum assist (25% patient effort)   Transfers   Transfers sit-stand transfer   Sit-Stand Transfer   Sit-Stand [place] (Transfers) moderate assist (50% patient effort)   Sit/Stand Transfer Comments w\ hand hold assist   Balance   Balance Assessment sit to stand dynamic balance   Sit-to-Stand Balance moderate assist;1-person assist   Activities of Daily Living   BADL Assessment/Intervention lower body dressing   Lower Body Dressing Assessment/Training   Comment, (Lower Body Dressing) due to decreased 1 hand use   [place] Level (Lower Body Dressing) maximum assist (25% patient effort)   Clinical Impression   Criteria for Skilled Therapeutic Interventions Met (OT) Yes, treatment indicated   OT Diagnosis R humerus fx   Influenced by the following impairments fatigue, decreased ADLs   OT Problem List-Impairments impacting ADL activity tolerance impaired;balance;communication   Assessment of Occupational Performance 3-5 Performance Deficits   Identified Performance Deficits fatigue, decreased ADLs.balance, cognition, fatigue   Planned Therapy Interventions (OT) ADL retraining;cognition   Clinical Decision Making Complexity (OT) detailed assessment/moderate complexity   Risk & Benefits of therapy have been explained evaluation/treatment results reviewed;care plan/treatment goals reviewed;risks/benefits reviewed;participants voiced agreement with care plan   OT Total Evaluation Time   OT Eval, Moderate Complexity Minutes (44025) 10   Therapy Certification   Medical Diagnosis humerus fx   Start of Care Date 01/20/25   Certification date from 01/21/25   Certification date to 01/27/25   OT Goals   Therapy Frequency (OT) 5 times/week   OT Predicted Duration/Target Date for Goal Attainment 01/26/25   OT Goals Hygiene/Grooming;Upper Body Dressing;Lower Body Dressing;Toilet  Transfer/Toileting;Cognition   OT: Hygiene/Grooming supervision/stand-by assist   OT: Upper Body Dressing Supervision/stand-by assist   OT: Lower Body Dressing Supervision/stand-by assist   OT: Toilet Transfer/Toileting Supervision/stand-by assist   OT: Cognitive Patient/caregiver will verbalize understanding of cognitive assessment results/recommendations as needed for safe discharge planning   Interventions   Interventions Quick Adds Self-Care/Home Management   Self-Care/Home Management   Self-Care/Home Mgmt/ADL, Compensatory, Meal Prep Minutes (44372) 10   Symptoms Noted During/After Treatment (Meal Preparation/Planning Training) fatigue   Treatment Detail/Skilled Intervention transfers Mod A of 1 pt took 2 steps, G/H mod A due to decreased 1 hand use, UB/LB dress max A due to decreased 1 hand use.   OT Discharge Planning   OT Plan transfers/toileting, G/H, TB dress, sling to RUE   OT Discharge Recommendation (DC Rec) Transitional Care Facility   OT Rationale for DC Rec tpt has a decline in her ADLs and mobility needing a TCU to increase skills to PLOF   OT Brief overview of current status transfer Mod A, ADLs Max A   Total Session Time   Timed Code Treatment Minutes 10   Total Session Time (sum of timed and untimed services) 20

## 2025-01-21 NOTE — LETTER
1/21/2025      Bushra Mccoy  [address]  [place] [address]        [facility]    Code Status:  FULL CODE   Visit Type:   Chief Complaint   Patient presents with     TCU Admission     [facility] 1/19/2025 - 1/20/2025     Facility:  [facility] ([facility]) [[id number]]         HPI: Bushra Mccoy is a 84 year old female I am seeing today for admit to the TCU.  Past medical history includes Alzheimer's dementia, overactive bladder and chronic anemia with low platelets. Patient with recent fall sustaining a right humerus fracture.  He was seen by Ortho and splinted.  Pain control with Tylenol.  Imaging done at the ER found incidentally a thyroid nodule.  CT showed right thyroid lobe hypodense nodule.  Suggested follow-up ultrasound.    Transitional Care Course: On today's visit patient is sitting up in wheelchair.  She does have underlying cognitive impairment and somewhat of a poor historian.  She continues in splinting.  Positive CMS.  Pain control with Tylenol.  She denies any shortness of breath or chest pain.  No dizziness or headache.  Blood pressure appears on the soft side.  Nursing staff reported orthostatic hypotension with systolic BP down in his 60s with orthostatic check.  Suspect volume loss due to poor oral intake.  Fluids will be encouraged.      Assessment/Plan:     Right humerus neck fracture  -Xrays show an apparent subtle linear lucency within the cortex of the lateral aspect of the right humeral neck and apparent foreshortening of the humeral neck,   -CT shows impacted fracture involving the surgical neck of the proximal right humerus.   -Orthopedics consult patient placed in sling.  Positive CMS.  -Nonweightbearing right upper extremity.  -Tylenol 650 3 times daily.  -Oxycodone 5 mg every 4 hours as needed.  Will attempt to wean off for limit use secondary to underlying cognitive impairment.  -Follow-up with Ortho in 2 weeks.     Alzheimer's  dementia  Depression with anxiety   -Donepezil 23 mg nightly.  -Fluoxetine 40 mg every morning. (This could be contributory to orthostatic hypotension.) Monitor.    Overactive bladder  -tolterodine 4 mg Q AM.     Orthostatic Hypotension  ~ 20 pt drop in bp with Orthostatic bp checks. Pt asymptomatic.   -Fluoxetine may be contributory as well as poor oral intake.   -Give 240 ml of H2O with each med pass QID.   -TEDS on am/off HS for bp support.      Chronic anemia/low platelets   Low-grade B Cell lymphoma  -Had a bone marrow biopsy 2023  -Bone marrow study shows low-grade B-cell lymphoma  -Follow up CBC.   -Follows with  oncology clinic     Thyroid nodule   -Incidentally found on CT.   -Follow-up out pt with PCP for US.     -Ok for PT, OT to eval and treat.   -Follow up CBC and BMP.     Active Ambulatory Problems     Diagnosis Date Noted     Thyroid nodule 01/19/2025     Fall at home, initial encounter 01/19/2025     Closed fracture of proximal end of right humerus, unspecified fracture morphology, initial encounter 01/19/2025     Drug-induced constipation 01/20/2025     Acute vaginitis 09/26/2023     Generalized anxiety disorder 04/24/2023     Alzheimer's disease (H) 09/26/2023     Overactive bladder 09/26/2023     Diverticular disease of colon 11/07/2012     Diverticular disease of large intestine 12/07/2021     History of colonic polyps 12/09/2021     Major depressive disorder, recurrent, in remission, unspecified 04/24/2023     Polyp of colon 12/07/2021     Seasonal allergies 04/24/2023     Resolved Ambulatory Problems     Diagnosis Date Noted     No Resolved Ambulatory Problems     No Additional Past Medical History     Allergies   Allergen Reactions     Bactrim [Sulfamethoxazole-Trimethoprim] Rash     Imipramine Unknown     Ofloxacin Unknown     Penicillins Unknown     Quinolones Unknown     Sulfa Antibiotics Rash     Tricyclic Antidepressants Unknown       All Meds and Allergies reviewed in the record at the  "facility and is the most up-to-date.    Post Discharge Medication Reconciliation Status: discharge medications reconciled, continue medications without change  Current Outpatient Medications   Medication Sig Dispense Refill     acetaminophen (TYLENOL) 325 MG tablet Take 2 tablets (650 mg) by mouth 3 times daily.       atorvastatin (LIPITOR) 20 MG tablet Take 20 mg by mouth every evening.       donepezil (ARICEPT) 23 MG tablet Take 1 tablet by mouth at bedtime.       estradiol (ESTRACE) 0.1 MG/GM vaginal cream Place vaginally three times a week.       FLUoxetine (PROZAC) 40 MG capsule Take 40 mg by mouth daily.       ipratropium (ATROVENT) 0.03 % nasal spray Spray 2 sprays into both nostrils 3 times daily.       LORazepam (ATIVAN) 0.5 MG tablet Take 0.5 mg by mouth nightly as needed for anxiety.       multivitamin w/minerals (THERA-VIT-M) tablet Take 1 tablet by mouth daily.       oxyCODONE (ROXICODONE) 5 MG tablet Take 1 tablet (5 mg) by mouth every 4 hours as needed for moderate pain. 20 tablet 0     senna-docusate (SENOKOT-S/PERICOLACE) 8.6-50 MG tablet Take 1 tablet by mouth 2 times daily as needed for constipation.       tolterodine ER (DETROL LA) 4 MG 24 hr capsule Take 4 mg by mouth daily.       No current facility-administered medications for this visit.       REVIEW OF SYSTEMS:   10 point review of systems reviewed and pertinent positives in the HPI.     PHYSICAL EXAMINATION:  Physical Exam     Vital signs: BP (!) 158/71   Pulse 85   Temp 97.3  F (36.3  C)   Resp 16   Ht 1.473 m (4' 10\")   Wt 46.3 kg (102 lb)   SpO2 99%   BMI 21.32 kg/m    General: Awake, Alert, oriented x3, sitting up in wheelchair, appropriately, follows simple commands, conversant  HEENT:Pink conjunctiva, moist oral mucosa  NECK: Supple  CVS:  S1  S2, without murmur or gallop.   LUNG: Clear to auscultation, No wheezes, rales or rhonci.  BACK: No kyphosis of the thoracic spine  ABDOMEN: Soft, nontender to palpation, with positive " bowel sounds  EXTREMITIES: moves both upper and lower with limitation to RUE, RUE in sling + CMS, no pedal edema, no calf tenderness  SKIN: Warm and dry  NEUROLOGIC: Intact, pulses palpable  PSYCHIATRIC: Cognitive impairment noted however pt answering questions appropriately.       Labs:  All labs reviewed in the nursing home record and Epic   @  Lab Results   Component Value Date    WBC 7.1 01/20/2025     Lab Results   Component Value Date    RBC 2.98 01/20/2025     Lab Results   Component Value Date    HGB 9.5 01/20/2025     Lab Results   Component Value Date    HCT 28.7 01/20/2025     Lab Results   Component Value Date    MCV 96 01/20/2025     Lab Results   Component Value Date    MCH 31.9 01/20/2025     Lab Results   Component Value Date    MCHC 33.1 01/20/2025     Lab Results   Component Value Date    RDW 12.6 01/20/2025     Lab Results   Component Value Date    PLT 83 01/20/2025        @Last Comprehensive Metabolic Panel:  Sodium   Date Value Ref Range Status   01/20/2025 140 135 - 145 mmol/L Final     Potassium   Date Value Ref Range Status   01/20/2025 3.9 3.4 - 5.3 mmol/L Final   10/22/2021 3.9 3.5 - 5.0 mmol/L Final     Chloride   Date Value Ref Range Status   01/20/2025 104 98 - 107 mmol/L Final   10/22/2021 110 (H) 98 - 107 mmol/L Final     Carbon Dioxide (CO2)   Date Value Ref Range Status   01/20/2025 25 22 - 29 mmol/L Final   10/22/2021 26 22 - 31 mmol/L Final     Anion Gap   Date Value Ref Range Status   01/20/2025 11 7 - 15 mmol/L Final   10/22/2021 9 5 - 18 mmol/L Final     Glucose   Date Value Ref Range Status   01/20/2025 118 (H) 70 - 99 mg/dL Final   10/22/2021 103 70 - 125 mg/dL Final     Urea Nitrogen   Date Value Ref Range Status   01/20/2025 17.0 8.0 - 23.0 mg/dL Final   10/22/2021 16 8 - 28 mg/dL Final     Creatinine   Date Value Ref Range Status   01/20/2025 0.79 0.51 - 0.95 mg/dL Final     GFR Estimate   Date Value Ref Range Status   01/20/2025 74 >60 mL/min/1.73m2 Final     Comment:      eGFR calculated using 2021 CKD-EPI equation.   08/25/2020 59 (L) >60 mL/min/1.73m2 Final     Calcium   Date Value Ref Range Status   01/20/2025 9.6 8.8 - 10.4 mg/dL Final     Comment:     Reference intervals for this test were updated on 7/16/2024 to reflect our healthy population more accurately. There may be differences in the flagging of prior results with similar values performed with this method. Those prior results can be interpreted in the context of the updated reference intervals.       > 45 minutes spent preparing for this visit which included reviewing hospital records, labs, imaging, meds, consults as well as face to face time with pt and collaborating with nursing.       This note has been dictated using voice recognition software. Any grammatical or context distortions are unintentional and inherent to the software    Electronically signed by: Lizette Carbajal CNP       Sincerely,        Lizette Carbajal NP    Electronically signed

## 2025-01-22 DIAGNOSIS — S42.201A CLOSED FRACTURE OF PROXIMAL END OF RIGHT HUMERUS, UNSPECIFIED FRACTURE MORPHOLOGY, INITIAL ENCOUNTER: ICD-10-CM

## 2025-01-22 DIAGNOSIS — F41.1 GENERALIZED ANXIETY DISORDER: Primary | ICD-10-CM

## 2025-01-22 LAB
ANION GAP SERPL CALCULATED.3IONS-SCNC: 9 MMOL/L (ref 7–15)
BUN SERPL-MCNC: 26 MG/DL (ref 8–23)
CALCIUM SERPL-MCNC: 9.4 MG/DL (ref 8.8–10.4)
CHLORIDE SERPL-SCNC: 107 MMOL/L (ref 98–107)
CREAT SERPL-MCNC: 0.86 MG/DL (ref 0.51–0.95)
EGFRCR SERPLBLD CKD-EPI 2021: 67 ML/MIN/1.73M2
ERYTHROCYTE [DISTWIDTH] IN BLOOD BY AUTOMATED COUNT: 12.8 % (ref 10–15)
GLUCOSE SERPL-MCNC: 106 MG/DL (ref 70–99)
HCO3 SERPL-SCNC: 25 MMOL/L (ref 22–29)
HCT VFR BLD AUTO: 27.2 % (ref 35–47)
HGB BLD-MCNC: 9.1 G/DL (ref 11.7–15.7)
MCH RBC QN AUTO: 32.3 PG (ref 26.5–33)
MCHC RBC AUTO-ENTMCNC: 33.5 G/DL (ref 31.5–36.5)
MCV RBC AUTO: 97 FL (ref 78–100)
PLATELET # BLD AUTO: 84 10E3/UL (ref 150–450)
POTASSIUM SERPL-SCNC: 3.9 MMOL/L (ref 3.4–5.3)
RBC # BLD AUTO: 2.82 10E6/UL (ref 3.8–5.2)
SODIUM SERPL-SCNC: 141 MMOL/L (ref 135–145)
WBC # BLD AUTO: 5 10E3/UL (ref 4–11)

## 2025-01-22 PROCEDURE — 80048 BASIC METABOLIC PNL TOTAL CA: CPT | Mod: ORL | Performed by: NURSE PRACTITIONER

## 2025-01-22 PROCEDURE — 36415 COLL VENOUS BLD VENIPUNCTURE: CPT | Mod: ORL | Performed by: NURSE PRACTITIONER

## 2025-01-22 PROCEDURE — P9604 ONE-WAY ALLOW PRORATED TRIP: HCPCS | Mod: ORL | Performed by: NURSE PRACTITIONER

## 2025-01-22 PROCEDURE — 85027 COMPLETE CBC AUTOMATED: CPT | Mod: ORL | Performed by: NURSE PRACTITIONER

## 2025-01-22 RX ORDER — OXYCODONE HYDROCHLORIDE 5 MG/1
5 TABLET ORAL EVERY 4 HOURS PRN
Qty: 20 TABLET | Refills: 0 | Status: SHIPPED | OUTPATIENT
Start: 2025-01-22

## 2025-01-22 RX ORDER — LORAZEPAM 0.5 MG/1
TABLET ORAL
Qty: 20 TABLET | Refills: 0 | Status: SHIPPED | OUTPATIENT
Start: 2025-01-22

## 2025-01-23 ENCOUNTER — TRANSITIONAL CARE UNIT VISIT (OUTPATIENT)
Dept: GERIATRICS | Facility: CLINIC | Age: 85
End: 2025-01-23
Payer: COMMERCIAL

## 2025-01-23 VITALS
DIASTOLIC BLOOD PRESSURE: 70 MMHG | TEMPERATURE: 97.6 F | OXYGEN SATURATION: 100 % | BODY MASS INDEX: 21.79 KG/M2 | HEART RATE: 79 BPM | WEIGHT: 103.8 LBS | HEIGHT: 58 IN | RESPIRATION RATE: 16 BRPM | SYSTOLIC BLOOD PRESSURE: 98 MMHG

## 2025-01-23 DIAGNOSIS — S42.201D CLOSED FRACTURE OF PROXIMAL END OF RIGHT HUMERUS WITH ROUTINE HEALING, UNSPECIFIED FRACTURE MORPHOLOGY, SUBSEQUENT ENCOUNTER: ICD-10-CM

## 2025-01-23 DIAGNOSIS — F02.80 ALZHEIMER'S DISEASE (H): Primary | ICD-10-CM

## 2025-01-23 DIAGNOSIS — G30.9 ALZHEIMER'S DISEASE (H): Primary | ICD-10-CM

## 2025-01-23 DIAGNOSIS — F41.1 GENERALIZED ANXIETY DISORDER: ICD-10-CM

## 2025-01-23 DIAGNOSIS — E04.1 THYROID NODULE: ICD-10-CM

## 2025-01-23 DIAGNOSIS — D63.8 ANEMIA IN OTHER CHRONIC DISEASES CLASSIFIED ELSEWHERE: ICD-10-CM

## 2025-01-23 DIAGNOSIS — N32.81 OVERACTIVE BLADDER: ICD-10-CM

## 2025-01-23 DIAGNOSIS — F33.40 MAJOR DEPRESSIVE DISORDER, RECURRENT, IN REMISSION, UNSPECIFIED: ICD-10-CM

## 2025-01-23 NOTE — LETTER
1/23/2025      Bushra Mccoy  [address]  [place] [address]        [facility]    Code Status:  FULL CODE   Visit Type:   Chief Complaint   Patient presents with     TCU Follow Up     Facility:  [facility] ([facility]) [[id number]]         HPI: Bushra Mccoy is a 84 year old female I am seeing today for follow up on  the TCU.  Past medical history includes Alzheimer's dementia, overactive bladder and chronic anemia with low platelets. Patient with recent fall sustaining a right humerus fracture.  He was seen by Ortho and splinted.  Pain control with Tylenol.  Imaging done at the ER found incidentally a thyroid nodule.  CT showed right thyroid lobe hypodense nodule.  Suggested follow-up ultrasound.    Transitional Care Course: On today's visit patient is sitting up in wheelchair.  She does have underlying cognitive impairment and somewhat of a poor historian.Right humerus fx. She continues in sling. Pain controlled with tylenol. Pt denies any SOB or CP. BP on the soft side. Denies any dizziness or headache. Extra oral fluids ordered.       Assessment/Plan:     Right humerus neck fracture  -Xrays show an apparent subtle linear lucency within the cortex of the lateral aspect of the right humeral neck and apparent foreshortening of the humeral neck,   -CT shows impacted fracture involving the surgical neck of the proximal right humerus.   -Orthopedics consult patient placed in sling.  Positive CMS.  -Nonweightbearing right upper extremity.  -Tylenol 650 3 times daily.  -Oxycodone 5 mg every 4 hours as needed.  Will attempt to wean off for limit use secondary to underlying cognitive impairment.  -Follow-up with Ortho in 2 weeks.     Alzheimer's dementia  Depression with anxiety   -Donepezil 23 mg nightly.  -Fluoxetine 40 mg every morning. (This could be contributory to orthostatic hypotension.) Monitor.    Overactive bladder  -tolterodine 4 mg Q AM.     Orthostatic  Hypotension  ~ 20 pt drop in bp with Orthostatic bp checks. Pt asymptomatic.   -Fluoxetine may be contributory as well as poor oral intake.   -Give 240 ml of H2O with each med pass QID.   -TEDS on am/off HS for bp support.      Chronic anemia/low platelets   Low-grade B Cell lymphoma  -Had a bone marrow biopsy 2023  -Bone marrow study shows low-grade B-cell lymphoma  -Follow up CBC.   -Follows with  oncology clinic     Thyroid nodule   -Incidentally found on CT.   -Follow-up out pt with PCP for US.     -Follow up CBC and BMP reviewed. Hgb 9.1. BMP with BUN of 26. Continue increased fluid intake.     Active Ambulatory Problems     Diagnosis Date Noted     Thyroid nodule 01/19/2025     Fall at home, initial encounter 01/19/2025     Closed fracture of proximal end of right humerus, unspecified fracture morphology, initial encounter 01/19/2025     Drug-induced constipation 01/20/2025     Acute vaginitis 09/26/2023     Generalized anxiety disorder 04/24/2023     Alzheimer's disease (H) 09/26/2023     Overactive bladder 09/26/2023     Diverticular disease of colon 11/07/2012     Diverticular disease of large intestine 12/07/2021     History of colonic polyps 12/09/2021     Major depressive disorder, recurrent, in remission, unspecified 04/24/2023     Polyp of colon 12/07/2021     Seasonal allergies 04/24/2023     Resolved Ambulatory Problems     Diagnosis Date Noted     No Resolved Ambulatory Problems     No Additional Past Medical History     Allergies   Allergen Reactions     Bactrim [Sulfamethoxazole-Trimethoprim] Rash     Imipramine Unknown     Ofloxacin Unknown     Penicillins Unknown     Quinolones Unknown     Sulfa Antibiotics Rash     Tricyclic Antidepressants Unknown       All Meds and Allergies reviewed in the record at the facility and is the most up-to-date.  Current Outpatient Medications   Medication Sig Dispense Refill     acetaminophen (TYLENOL) 325 MG tablet Take 2 tablets (650 mg) by mouth 3 times daily.  "      atorvastatin (LIPITOR) 20 MG tablet Take 20 mg by mouth every evening.       donepezil (ARICEPT) 23 MG tablet Take 1 tablet by mouth at bedtime.       estradiol (ESTRACE) 0.1 MG/GM vaginal cream Place vaginally three times a week.       FLUoxetine (PROZAC) 40 MG capsule Take 40 mg by mouth daily.       ipratropium (ATROVENT) 0.03 % nasal spray Spray 2 sprays into both nostrils 3 times daily.       LORazepam (ATIVAN) 0.5 MG tablet TAKE 1 TABLET BY MOUTH EVERY NIGHT AS NEEDED 20 tablet 0     multivitamin w/minerals (THERA-VIT-M) tablet Take 1 tablet by mouth daily.       oxyCODONE (ROXICODONE) 5 MG tablet Take 1 tablet (5 mg) by mouth every 4 hours as needed for severe pain. 20 tablet 0     senna-docusate (SENOKOT-S/PERICOLACE) 8.6-50 MG tablet Take 1 tablet by mouth 2 times daily as needed for constipation.       tolterodine ER (DETROL LA) 4 MG 24 hr capsule Take 4 mg by mouth daily.       No current facility-administered medications for this visit.       REVIEW OF SYSTEMS:   10 point review of systems reviewed and pertinent positives in the HPI.     PHYSICAL EXAMINATION:  Physical Exam     Vital signs: BP 98/70   Pulse 79   Temp 97.6  F (36.4  C)   Resp 16   Ht 1.473 m (4' 10\")   Wt 47.1 kg (103 lb 12.8 oz)   SpO2 100%   BMI 21.69 kg/m    General: Awake, Alert, oriented x3, sitting up in wheelchair, appropriately, follows simple commands, conversant  HEENT:Pink conjunctiva, moist oral mucosa  NECK: Supple  CVS:  S1  S2, without murmur or gallop.   LUNG: Clear to auscultation, No wheezes, rales or rhonci.  BACK: No kyphosis of the thoracic spine  ABDOMEN: Soft, nontender to palpation, with positive bowel sounds  EXTREMITIES: moves both upper and lower with limitation to RUE, RUE in sling + CMS, no pedal edema, no calf tenderness  SKIN: Warm and dry  NEUROLOGIC: Intact, pulses palpable  PSYCHIATRIC: Cognitive impairment noted however pt answering questions appropriately.       Labs:  All labs reviewed in " the nursing home record and Epic   @  Lab Results   Component Value Date    WBC 7.1 01/20/2025     Lab Results   Component Value Date    RBC 2.98 01/20/2025     Lab Results   Component Value Date    HGB 9.5 01/20/2025     Lab Results   Component Value Date    HCT 28.7 01/20/2025     Lab Results   Component Value Date    MCV 96 01/20/2025     Lab Results   Component Value Date    MCH 31.9 01/20/2025     Lab Results   Component Value Date    MCHC 33.1 01/20/2025     Lab Results   Component Value Date    RDW 12.6 01/20/2025     Lab Results   Component Value Date    PLT 83 01/20/2025        @Last Comprehensive Metabolic Panel:  Sodium   Date Value Ref Range Status   01/22/2025 141 135 - 145 mmol/L Final     Potassium   Date Value Ref Range Status   01/22/2025 3.9 3.4 - 5.3 mmol/L Final   10/22/2021 3.9 3.5 - 5.0 mmol/L Final     Chloride   Date Value Ref Range Status   01/22/2025 107 98 - 107 mmol/L Final   10/22/2021 110 (H) 98 - 107 mmol/L Final     Carbon Dioxide (CO2)   Date Value Ref Range Status   01/22/2025 25 22 - 29 mmol/L Final   10/22/2021 26 22 - 31 mmol/L Final     Anion Gap   Date Value Ref Range Status   01/22/2025 9 7 - 15 mmol/L Final   10/22/2021 9 5 - 18 mmol/L Final     Glucose   Date Value Ref Range Status   01/22/2025 106 (H) 70 - 99 mg/dL Final   10/22/2021 103 70 - 125 mg/dL Final     Urea Nitrogen   Date Value Ref Range Status   01/22/2025 26.0 (H) 8.0 - 23.0 mg/dL Final   10/22/2021 16 8 - 28 mg/dL Final     Creatinine   Date Value Ref Range Status   01/22/2025 0.86 0.51 - 0.95 mg/dL Final     GFR Estimate   Date Value Ref Range Status   01/22/2025 67 >60 mL/min/1.73m2 Final   08/25/2020 59 (L) >60 mL/min/1.73m2 Final     Calcium   Date Value Ref Range Status   01/22/2025 9.4 8.8 - 10.4 mg/dL Final     Comment:     Reference intervals for this test were updated on 7/16/2024 to reflect our healthy population more accurately. There may be differences in the flagging of prior results with similar  values performed with this method. Those prior results can be interpreted in the context of the updated reference intervals.         This note has been dictated using voice recognition software. Any grammatical or context distortions are unintentional and inherent to the software    Electronically signed by: Lizette Carbajal CNP       Sincerely,        Lizette Carbajal NP    Electronically signed

## 2025-01-23 NOTE — PROGRESS NOTES
[facility]    Code Status:  FULL CODE   Visit Type:   Chief Complaint   Patient presents with    TCU Follow Up     Facility:  [facility] ([facility]) [[id number]]         HPI: Bushra Mccoy is a 84 year old female I am seeing today for follow up on  the TCU.  Past medical history includes Alzheimer's dementia, overactive bladder and chronic anemia with low platelets. Patient with recent fall sustaining a right humerus fracture.  He was seen by Ortho and splinted.  Pain control with Tylenol.  Imaging done at the ER found incidentally a thyroid nodule.  CT showed right thyroid lobe hypodense nodule.  Suggested follow-up ultrasound.    Transitional Care Course: On today's visit patient is sitting up in wheelchair.  She does have underlying cognitive impairment and somewhat of a poor historian.Right humerus fx. She continues in sling. Pain controlled with tylenol. Pt denies any SOB or CP. BP on the soft side. Denies any dizziness or headache. Extra oral fluids ordered.       Assessment/Plan:     Right humerus neck fracture  -Xrays show an apparent subtle linear lucency within the cortex of the lateral aspect of the right humeral neck and apparent foreshortening of the humeral neck,   -CT shows impacted fracture involving the surgical neck of the proximal right humerus.   -Orthopedics consult patient placed in sling.  Positive CMS.  -Nonweightbearing right upper extremity.  -Tylenol 650 3 times daily.  -Oxycodone 5 mg every 4 hours as needed.  Will attempt to wean off for limit use secondary to underlying cognitive impairment.  -Follow-up with Ortho in 2 weeks.     Alzheimer's dementia  Depression with anxiety   -Donepezil 23 mg nightly.  -Fluoxetine 40 mg every morning. (This could be contributory to orthostatic hypotension.) Monitor.    Overactive bladder  -tolterodine 4 mg Q AM.     Orthostatic Hypotension  ~ 20 pt drop in bp with Orthostatic bp checks. Pt asymptomatic.   -Fluoxetine may be  contributory as well as poor oral intake.   -Give 240 ml of H2O with each med pass QID.   -TEDS on am/off HS for bp support.      Chronic anemia/low platelets   Low-grade B Cell lymphoma  -Had a bone marrow biopsy 2023  -Bone marrow study shows low-grade B-cell lymphoma  -Follow up CBC.   -Follows with  oncology clinic     Thyroid nodule   -Incidentally found on CT.   -Follow-up out pt with PCP for US.     -Follow up CBC and BMP reviewed. Hgb 9.1. BMP with BUN of 26. Continue increased fluid intake.     Active Ambulatory Problems     Diagnosis Date Noted    Thyroid nodule 01/19/2025    Fall at home, initial encounter 01/19/2025    Closed fracture of proximal end of right humerus, unspecified fracture morphology, initial encounter 01/19/2025    Drug-induced constipation 01/20/2025    Acute vaginitis 09/26/2023    Generalized anxiety disorder 04/24/2023    Alzheimer's disease (H) 09/26/2023    Overactive bladder 09/26/2023    Diverticular disease of colon 11/07/2012    Diverticular disease of large intestine 12/07/2021    History of colonic polyps 12/09/2021    Major depressive disorder, recurrent, in remission, unspecified 04/24/2023    Polyp of colon 12/07/2021    Seasonal allergies 04/24/2023     Resolved Ambulatory Problems     Diagnosis Date Noted    No Resolved Ambulatory Problems     No Additional Past Medical History     Allergies   Allergen Reactions    Bactrim [Sulfamethoxazole-Trimethoprim] Rash    Imipramine Unknown    Ofloxacin Unknown    Penicillins Unknown    Quinolones Unknown    Sulfa Antibiotics Rash    Tricyclic Antidepressants Unknown       All Meds and Allergies reviewed in the record at the facility and is the most up-to-date.  Current Outpatient Medications   Medication Sig Dispense Refill    acetaminophen (TYLENOL) 325 MG tablet Take 2 tablets (650 mg) by mouth 3 times daily.      atorvastatin (LIPITOR) 20 MG tablet Take 20 mg by mouth every evening.      donepezil (ARICEPT) 23 MG tablet Take 1  "tablet by mouth at bedtime.      estradiol (ESTRACE) 0.1 MG/GM vaginal cream Place vaginally three times a week.      FLUoxetine (PROZAC) 40 MG capsule Take 40 mg by mouth daily.      ipratropium (ATROVENT) 0.03 % nasal spray Spray 2 sprays into both nostrils 3 times daily.      LORazepam (ATIVAN) 0.5 MG tablet TAKE 1 TABLET BY MOUTH EVERY NIGHT AS NEEDED 20 tablet 0    multivitamin w/minerals (THERA-VIT-M) tablet Take 1 tablet by mouth daily.      oxyCODONE (ROXICODONE) 5 MG tablet Take 1 tablet (5 mg) by mouth every 4 hours as needed for severe pain. 20 tablet 0    senna-docusate (SENOKOT-S/PERICOLACE) 8.6-50 MG tablet Take 1 tablet by mouth 2 times daily as needed for constipation.      tolterodine ER (DETROL LA) 4 MG 24 hr capsule Take 4 mg by mouth daily.       No current facility-administered medications for this visit.       REVIEW OF SYSTEMS:   10 point review of systems reviewed and pertinent positives in the HPI.     PHYSICAL EXAMINATION:  Physical Exam     Vital signs: BP 98/70   Pulse 79   Temp 97.6  F (36.4  C)   Resp 16   Ht 1.473 m (4' 10\")   Wt 47.1 kg (103 lb 12.8 oz)   SpO2 100%   BMI 21.69 kg/m    General: Awake, Alert, oriented x3, sitting up in wheelchair, appropriately, follows simple commands, conversant  HEENT:Pink conjunctiva, moist oral mucosa  NECK: Supple  CVS:  S1  S2, without murmur or gallop.   LUNG: Clear to auscultation, No wheezes, rales or rhonci.  BACK: No kyphosis of the thoracic spine  ABDOMEN: Soft, nontender to palpation, with positive bowel sounds  EXTREMITIES: moves both upper and lower with limitation to RUE, RUE in sling + CMS, no pedal edema, no calf tenderness  SKIN: Warm and dry  NEUROLOGIC: Intact, pulses palpable  PSYCHIATRIC: Cognitive impairment noted however pt answering questions appropriately.       Labs:  All labs reviewed in the nursing home record and [facility]   @  Lab Results   Component Value Date    WBC 7.1 01/20/2025     Lab Results   Component Value " Date    RBC 2.98 01/20/2025     Lab Results   Component Value Date    HGB 9.5 01/20/2025     Lab Results   Component Value Date    HCT 28.7 01/20/2025     Lab Results   Component Value Date    MCV 96 01/20/2025     Lab Results   Component Value Date    MCH 31.9 01/20/2025     Lab Results   Component Value Date    MCHC 33.1 01/20/2025     Lab Results   Component Value Date    RDW 12.6 01/20/2025     Lab Results   Component Value Date    PLT 83 01/20/2025        @Last Comprehensive Metabolic Panel:  Sodium   Date Value Ref Range Status   01/22/2025 141 135 - 145 mmol/L Final     Potassium   Date Value Ref Range Status   01/22/2025 3.9 3.4 - 5.3 mmol/L Final   10/22/2021 3.9 3.5 - 5.0 mmol/L Final     Chloride   Date Value Ref Range Status   01/22/2025 107 98 - 107 mmol/L Final   10/22/2021 110 (H) 98 - 107 mmol/L Final     Carbon Dioxide (CO2)   Date Value Ref Range Status   01/22/2025 25 22 - 29 mmol/L Final   10/22/2021 26 22 - 31 mmol/L Final     Anion Gap   Date Value Ref Range Status   01/22/2025 9 7 - 15 mmol/L Final   10/22/2021 9 5 - 18 mmol/L Final     Glucose   Date Value Ref Range Status   01/22/2025 106 (H) 70 - 99 mg/dL Final   10/22/2021 103 70 - 125 mg/dL Final     Urea Nitrogen   Date Value Ref Range Status   01/22/2025 26.0 (H) 8.0 - 23.0 mg/dL Final   10/22/2021 16 8 - 28 mg/dL Final     Creatinine   Date Value Ref Range Status   01/22/2025 0.86 0.51 - 0.95 mg/dL Final     GFR Estimate   Date Value Ref Range Status   01/22/2025 67 >60 mL/min/1.73m2 Final   08/25/2020 59 (L) >60 mL/min/1.73m2 Final     Calcium   Date Value Ref Range Status   01/22/2025 9.4 8.8 - 10.4 mg/dL Final     Comment:     Reference intervals for this test were updated on 7/16/2024 to reflect our healthy population more accurately. There may be differences in the flagging of prior results with similar values performed with this method. Those prior results can be interpreted in the context of the updated reference intervals.          This note has been dictated using voice recognition software. Any grammatical or context distortions are unintentional and inherent to the software    Electronically signed by: Lizette Carbajal CNP

## 2025-01-30 ENCOUNTER — TRANSITIONAL CARE UNIT VISIT (OUTPATIENT)
Dept: GERIATRICS | Facility: CLINIC | Age: 85
End: 2025-01-30
Payer: COMMERCIAL

## 2025-01-30 VITALS
DIASTOLIC BLOOD PRESSURE: 77 MMHG | HEART RATE: 81 BPM | OXYGEN SATURATION: 96 % | TEMPERATURE: 97.8 F | HEIGHT: 58 IN | BODY MASS INDEX: 21.2 KG/M2 | SYSTOLIC BLOOD PRESSURE: 152 MMHG | RESPIRATION RATE: 16 BRPM | WEIGHT: 101 LBS

## 2025-01-30 DIAGNOSIS — D63.8 ANEMIA IN OTHER CHRONIC DISEASES CLASSIFIED ELSEWHERE: ICD-10-CM

## 2025-01-30 DIAGNOSIS — S42.201D CLOSED FRACTURE OF PROXIMAL END OF RIGHT HUMERUS WITH ROUTINE HEALING, UNSPECIFIED FRACTURE MORPHOLOGY, SUBSEQUENT ENCOUNTER: ICD-10-CM

## 2025-01-30 DIAGNOSIS — F02.80 ALZHEIMER'S DISEASE (H): Primary | ICD-10-CM

## 2025-01-30 DIAGNOSIS — E04.1 THYROID NODULE: ICD-10-CM

## 2025-01-30 DIAGNOSIS — G30.9 ALZHEIMER'S DISEASE (H): Primary | ICD-10-CM

## 2025-01-30 DIAGNOSIS — F33.40 MAJOR DEPRESSIVE DISORDER, RECURRENT, IN REMISSION, UNSPECIFIED: ICD-10-CM

## 2025-01-30 DIAGNOSIS — F51.01 PRIMARY INSOMNIA: ICD-10-CM

## 2025-01-30 DIAGNOSIS — F41.1 GENERALIZED ANXIETY DISORDER: ICD-10-CM

## 2025-01-30 DIAGNOSIS — N32.81 OVERACTIVE BLADDER: ICD-10-CM

## 2025-01-30 PROCEDURE — 99309 SBSQ NF CARE MODERATE MDM 30: CPT | Performed by: NURSE PRACTITIONER

## 2025-01-30 NOTE — LETTER
1/30/2025      Bushra Mccoy  [address] [place] [address]        No notes on file      Sincerely,        Lizette Carbajal NP    Electronically signed   Will attempt to wean off for limit use secondary to underlying cognitive impairment.  -Follow-up with Ortho in 2 weeks.     Alzheimer's dementia  Depression with anxiety   Insomnia   -SLUMS 14/30  -Donepezil 23 mg nightly.  -Fluoxetine 40 mg every morning. (This could be contributory to orthostatic hypotension.) Monitor.  -unable to tolerate melatonin. Discontinue.    -pt was using lorazepam at home however discontinued due to falls and orthostatic hypotension.     Overactive bladder  -tolterodine 4 mg Q AM.     Orthostatic Hypotension  ~ 20 pt drop in bp with Orthostatic bp checks initially.  Pt asymptomatic. BP now within normal limits.   -Fluoxetine may be contributory as well as poor oral intake.   -Give 240 ml of H2O with each med pass QID.   -TEDS on am/off HS for bp support.      Chronic anemia/low platelets   Low-grade B Cell lymphoma  -Had a bone marrow biopsy 2023  -Bone marrow study shows low-grade B-cell lymphoma  -Follow up CBC.   -Follows with  oncology clinic     Thyroid nodule   -Incidentally found on CT.   -Follow-up out pt with PCP for US.       Active Ambulatory Problems     Diagnosis Date Noted     Thyroid nodule 01/19/2025     Fall at home, initial encounter 01/19/2025     Closed fracture of proximal end of right humerus, unspecified fracture morphology, initial encounter 01/19/2025     Drug-induced constipation 01/20/2025     Acute vaginitis 09/26/2023     Generalized anxiety disorder 04/24/2023     Alzheimer's disease (H) 09/26/2023     Overactive bladder 09/26/2023     Diverticular disease of colon 11/07/2012     Diverticular disease of large intestine 12/07/2021     History of colonic polyps 12/09/2021     Major depressive disorder, recurrent, in remission, unspecified 04/24/2023     Polyp of colon 12/07/2021     Seasonal allergies 04/24/2023     Resolved Ambulatory Problems     Diagnosis Date Noted     No Resolved Ambulatory Problems     No Additional Past Medical History     Allergies  "  Allergen Reactions     Bactrim [Sulfamethoxazole-Trimethoprim] Rash     Imipramine Unknown     Ofloxacin Unknown     Penicillins Unknown     Quinolones Unknown     Sulfa Antibiotics Rash     Tricyclic Antidepressants Unknown       All Meds and Allergies reviewed in the record at the facility and is the most up-to-date.  Current Outpatient Medications   Medication Sig Dispense Refill     acetaminophen (TYLENOL) 325 MG tablet Take 2 tablets (650 mg) by mouth 3 times daily.       atorvastatin (LIPITOR) 20 MG tablet Take 20 mg by mouth every evening.       donepezil (ARICEPT) 23 MG tablet Take 1 tablet by mouth at bedtime.       estradiol (ESTRACE) 0.1 MG/GM vaginal cream Place vaginally three times a week.       FLUoxetine (PROZAC) 40 MG capsule Take 40 mg by mouth daily.       ipratropium (ATROVENT) 0.03 % nasal spray Spray 2 sprays into both nostrils 3 times daily.       LORazepam (ATIVAN) 0.5 MG tablet TAKE 1 TABLET BY MOUTH EVERY NIGHT AS NEEDED 20 tablet 0     multivitamin w/minerals (THERA-VIT-M) tablet Take 1 tablet by mouth daily.       oxyCODONE (ROXICODONE) 5 MG tablet Take 1 tablet (5 mg) by mouth every 4 hours as needed for severe pain. 20 tablet 0     senna-docusate (SENOKOT-S/PERICOLACE) 8.6-50 MG tablet Take 1 tablet by mouth 2 times daily as needed for constipation.       tolterodine ER (DETROL LA) 4 MG 24 hr capsule Take 4 mg by mouth daily.       No current facility-administered medications for this visit.       REVIEW OF SYSTEMS:   10 point review of systems reviewed and pertinent positives in the HPI.     PHYSICAL EXAMINATION:  Physical Exam     Vital signs: BP (!) 152/77   Pulse 81   Temp 97.8  F (36.6  C)   Resp 16   Ht 1.473 m (4' 10\")   Wt 45.8 kg (101 lb)   SpO2 96%   BMI 21.11 kg/m    General: Awake, Alert, oriented x3, sitting up in wheelchair, appropriately, follows simple commands, conversant  HEENT:Pink conjunctiva, moist oral mucosa  NECK: Supple  CVS:  S1  S2, without murmur or " gallop.   LUNG: Clear to auscultation, No wheezes, rales or rhonci.  BACK: No kyphosis of the thoracic spine  ABDOMEN: Soft, nontender to palpation, with positive bowel sounds  EXTREMITIES: moves both upper and lower with limitation to RUE, RUE in sling + CMS, no pedal edema, no calf tenderness  SKIN: Warm and dry  NEUROLOGIC: Intact, pulses palpable  PSYCHIATRIC: Cognitive impairment noted however pt answering questions appropriately.       Labs:  All labs reviewed in the nursing home record and [facility]   @  Lab Results   Component Value Date    WBC 7.1 01/20/2025     Lab Results   Component Value Date    RBC 2.98 01/20/2025     Lab Results   Component Value Date    HGB 9.5 01/20/2025     Lab Results   Component Value Date    HCT 28.7 01/20/2025     Lab Results   Component Value Date    MCV 96 01/20/2025     Lab Results   Component Value Date    MCH 31.9 01/20/2025     Lab Results   Component Value Date    MCHC 33.1 01/20/2025     Lab Results   Component Value Date    RDW 12.6 01/20/2025     Lab Results   Component Value Date    PLT 83 01/20/2025        @Last Comprehensive Metabolic Panel:  Sodium   Date Value Ref Range Status   01/22/2025 141 135 - 145 mmol/L Final     Potassium   Date Value Ref Range Status   01/22/2025 3.9 3.4 - 5.3 mmol/L Final   10/22/2021 3.9 3.5 - 5.0 mmol/L Final     Chloride   Date Value Ref Range Status   01/22/2025 107 98 - 107 mmol/L Final   10/22/2021 110 (H) 98 - 107 mmol/L Final     Carbon Dioxide (CO2)   Date Value Ref Range Status   01/22/2025 25 22 - 29 mmol/L Final   10/22/2021 26 22 - 31 mmol/L Final     Anion Gap   Date Value Ref Range Status   01/22/2025 9 7 - 15 mmol/L Final   10/22/2021 9 5 - 18 mmol/L Final     Glucose   Date Value Ref Range Status   01/22/2025 106 (H) 70 - 99 mg/dL Final   10/22/2021 103 70 - 125 mg/dL Final     Urea Nitrogen   Date Value Ref Range Status   01/22/2025 26.0 (H) 8.0 - 23.0 mg/dL Final   10/22/2021 16 8 - 28 mg/dL Final     Creatinine   Date  Value Ref Range Status   01/22/2025 0.86 0.51 - 0.95 mg/dL Final     GFR Estimate   Date Value Ref Range Status   01/22/2025 67 >60 mL/min/1.73m2 Final   08/25/2020 59 (L) >60 mL/min/1.73m2 Final     Calcium   Date Value Ref Range Status   01/22/2025 9.4 8.8 - 10.4 mg/dL Final     Comment:     Reference intervals for this test were updated on 7/16/2024 to reflect our healthy population more accurately. There may be differences in the flagging of prior results with similar values performed with this method. Those prior results can be interpreted in the context of the updated reference intervals.         This note has been dictated using voice recognition software. Any grammatical or context distortions are unintentional and inherent to the software    Electronically signed by: Lizette Carbajal CNP       Sincerely,        Lizette Carbajal NP    Electronically signed

## 2025-02-01 NOTE — PROGRESS NOTES
[facility] GERIATRIC SERVICES    Code Status:  FULL CODE   Visit Type:   Chief Complaint   Patient presents with    TCU Follow Up     Facility:  [facility] ([facility]) [[id number]]         HPI: Bushra Mccoy is a 84 year old female I am seeing today for follow up on  the TCU.  Past medical history includes Alzheimer's dementia, overactive bladder and chronic anemia with low platelets. Patient with recent fall sustaining a right humerus fracture.  He was seen by Ortho and splinted.  Pain control with Tylenol.  Imaging done at the ER found incidentally a thyroid nodule.  CT showed right thyroid lobe hypodense nodule.  Suggested follow-up ultrasound.    Transitional Care Course: On today's visit patient is sitting up in wheelchair.  She does have underlying cognitive impairment and somewhat of a poor historian. Recent Right humerus fx. She continues in sling. Pain controlled with tylenol. Pt with underlying anxiety and insomnia. She is continued on her home fluoxetine. She was also on lorazepam at home. This was discontinued due to risk of falls and orthostatic hypotension. Pt intially having ~ 20 pt drop in bp on admit. BP has improved. Pt asymptomatic. Pt trialed on melatonin. She had night terrors which her daughter reports is a history she had previously on melatonin. This discontinued. Per the nursing staff pt slept well last evening off meds.       Assessment/Plan:     Right humerus neck fracture  -Xrays show an apparent subtle linear lucency within the cortex of the lateral aspect of the right humeral neck and apparent foreshortening of the humeral neck,   -CT shows impacted fracture involving the surgical neck of the proximal right humerus.   -Orthopedics consult patient placed in sling.  Positive CMS.  -Nonweightbearing right upper extremity.  -Tylenol 650 3 times daily.  -Oxycodone 5 mg every 4 hours as needed.  Will attempt to wean off for limit use secondary to underlying cognitive  impairment.  -Follow-up with Ortho in 2 weeks.     Alzheimer's dementia  Depression with anxiety   Insomnia   -SLUMS 14/30  -Donepezil 23 mg nightly.  -Fluoxetine 40 mg every morning. (This could be contributory to orthostatic hypotension.) Monitor.  -unable to tolerate melatonin. Discontinue.    -pt was using lorazepam at home however discontinued due to falls and orthostatic hypotension.     Overactive bladder  -tolterodine 4 mg Q AM.     Orthostatic Hypotension  ~ 20 pt drop in bp with Orthostatic bp checks initially.  Pt asymptomatic. BP now within normal limits.   -Fluoxetine may be contributory as well as poor oral intake.   -Give 240 ml of H2O with each med pass QID.   -TEDS on am/off HS for bp support.      Chronic anemia/low platelets   Low-grade B Cell lymphoma  -Had a bone marrow biopsy 2023  -Bone marrow study shows low-grade B-cell lymphoma  -Follow up CBC.   -Follows with  oncology clinic     Thyroid nodule   -Incidentally found on CT.   -Follow-up out pt with PCP for US.       Active Ambulatory Problems     Diagnosis Date Noted    Thyroid nodule 01/19/2025    Fall at home, initial encounter 01/19/2025    Closed fracture of proximal end of right humerus, unspecified fracture morphology, initial encounter 01/19/2025    Drug-induced constipation 01/20/2025    Acute vaginitis 09/26/2023    Generalized anxiety disorder 04/24/2023    Alzheimer's disease (H) 09/26/2023    Overactive bladder 09/26/2023    Diverticular disease of colon 11/07/2012    Diverticular disease of large intestine 12/07/2021    History of colonic polyps 12/09/2021    Major depressive disorder, recurrent, in remission, unspecified 04/24/2023    Polyp of colon 12/07/2021    Seasonal allergies 04/24/2023     Resolved Ambulatory Problems     Diagnosis Date Noted    No Resolved Ambulatory Problems     No Additional Past Medical History     Allergies   Allergen Reactions    Bactrim [Sulfamethoxazole-Trimethoprim] Rash    Imipramine Unknown  "   Ofloxacin Unknown    Penicillins Unknown    Quinolones Unknown    Sulfa Antibiotics Rash    Tricyclic Antidepressants Unknown       All Meds and Allergies reviewed in the record at the facility and is the most up-to-date.  Current Outpatient Medications   Medication Sig Dispense Refill    acetaminophen (TYLENOL) 325 MG tablet Take 2 tablets (650 mg) by mouth 3 times daily.      atorvastatin (LIPITOR) 20 MG tablet Take 20 mg by mouth every evening.      donepezil (ARICEPT) 23 MG tablet Take 1 tablet by mouth at bedtime.      estradiol (ESTRACE) 0.1 MG/GM vaginal cream Place vaginally three times a week.      FLUoxetine (PROZAC) 40 MG capsule Take 40 mg by mouth daily.      ipratropium (ATROVENT) 0.03 % nasal spray Spray 2 sprays into both nostrils 3 times daily.      LORazepam (ATIVAN) 0.5 MG tablet TAKE 1 TABLET BY MOUTH EVERY NIGHT AS NEEDED 20 tablet 0    multivitamin w/minerals (THERA-VIT-M) tablet Take 1 tablet by mouth daily.      oxyCODONE (ROXICODONE) 5 MG tablet Take 1 tablet (5 mg) by mouth every 4 hours as needed for severe pain. 20 tablet 0    senna-docusate (SENOKOT-S/PERICOLACE) 8.6-50 MG tablet Take 1 tablet by mouth 2 times daily as needed for constipation.      tolterodine ER (DETROL LA) 4 MG 24 hr capsule Take 4 mg by mouth daily.       No current facility-administered medications for this visit.       REVIEW OF SYSTEMS:   10 point review of systems reviewed and pertinent positives in the HPI.     PHYSICAL EXAMINATION:  Physical Exam     Vital signs: BP (!) 152/77   Pulse 81   Temp 97.8  F (36.6  C)   Resp 16   Ht 1.473 m (4' 10\")   Wt 45.8 kg (101 lb)   SpO2 96%   BMI 21.11 kg/m    General: Awake, Alert, oriented x3, sitting up in wheelchair, appropriately, follows simple commands, conversant  HEENT:Pink conjunctiva, moist oral mucosa  NECK: Supple  CVS:  S1  S2, without murmur or gallop.   LUNG: Clear to auscultation, No wheezes, rales or rhonci.  BACK: No kyphosis of the thoracic " spine  ABDOMEN: Soft, nontender to palpation, with positive bowel sounds  EXTREMITIES: moves both upper and lower with limitation to RUE, RUE in sling + CMS, no pedal edema, no calf tenderness  SKIN: Warm and dry  NEUROLOGIC: Intact, pulses palpable  PSYCHIATRIC: Cognitive impairment noted however pt answering questions appropriately.       Labs:  All labs reviewed in the nursing home record and Epic   @  Lab Results   Component Value Date    WBC 7.1 01/20/2025     Lab Results   Component Value Date    RBC 2.98 01/20/2025     Lab Results   Component Value Date    HGB 9.5 01/20/2025     Lab Results   Component Value Date    HCT 28.7 01/20/2025     Lab Results   Component Value Date    MCV 96 01/20/2025     Lab Results   Component Value Date    MCH 31.9 01/20/2025     Lab Results   Component Value Date    MCHC 33.1 01/20/2025     Lab Results   Component Value Date    RDW 12.6 01/20/2025     Lab Results   Component Value Date    PLT 83 01/20/2025        @Last Comprehensive Metabolic Panel:  Sodium   Date Value Ref Range Status   01/22/2025 141 135 - 145 mmol/L Final     Potassium   Date Value Ref Range Status   01/22/2025 3.9 3.4 - 5.3 mmol/L Final   10/22/2021 3.9 3.5 - 5.0 mmol/L Final     Chloride   Date Value Ref Range Status   01/22/2025 107 98 - 107 mmol/L Final   10/22/2021 110 (H) 98 - 107 mmol/L Final     Carbon Dioxide (CO2)   Date Value Ref Range Status   01/22/2025 25 22 - 29 mmol/L Final   10/22/2021 26 22 - 31 mmol/L Final     Anion Gap   Date Value Ref Range Status   01/22/2025 9 7 - 15 mmol/L Final   10/22/2021 9 5 - 18 mmol/L Final     Glucose   Date Value Ref Range Status   01/22/2025 106 (H) 70 - 99 mg/dL Final   10/22/2021 103 70 - 125 mg/dL Final     Urea Nitrogen   Date Value Ref Range Status   01/22/2025 26.0 (H) 8.0 - 23.0 mg/dL Final   10/22/2021 16 8 - 28 mg/dL Final     Creatinine   Date Value Ref Range Status   01/22/2025 0.86 0.51 - 0.95 mg/dL Final     GFR Estimate   Date Value Ref Range  Status   01/22/2025 67 >60 mL/min/1.73m2 Final   08/25/2020 59 (L) >60 mL/min/1.73m2 Final     Calcium   Date Value Ref Range Status   01/22/2025 9.4 8.8 - 10.4 mg/dL Final     Comment:     Reference intervals for this test were updated on 7/16/2024 to reflect our healthy population more accurately. There may be differences in the flagging of prior results with similar values performed with this method. Those prior results can be interpreted in the context of the updated reference intervals.         This note has been dictated using voice recognition software. Any grammatical or context distortions are unintentional and inherent to the software    Electronically signed by: Lizette Carbajal CNP

## 2025-02-04 ENCOUNTER — TRANSITIONAL CARE UNIT VISIT (OUTPATIENT)
Dept: GERIATRICS | Facility: CLINIC | Age: 85
End: 2025-02-04
Payer: COMMERCIAL

## 2025-02-04 VITALS
RESPIRATION RATE: 16 BRPM | OXYGEN SATURATION: 97 % | HEART RATE: 69 BPM | TEMPERATURE: 97.5 F | BODY MASS INDEX: 22.15 KG/M2 | WEIGHT: 106 LBS | DIASTOLIC BLOOD PRESSURE: 64 MMHG | SYSTOLIC BLOOD PRESSURE: 155 MMHG

## 2025-02-04 DIAGNOSIS — N32.81 OVERACTIVE BLADDER: ICD-10-CM

## 2025-02-04 DIAGNOSIS — E04.1 THYROID NODULE: ICD-10-CM

## 2025-02-04 DIAGNOSIS — F51.01 PRIMARY INSOMNIA: ICD-10-CM

## 2025-02-04 DIAGNOSIS — F02.80 ALZHEIMER'S DISEASE (H): Primary | ICD-10-CM

## 2025-02-04 DIAGNOSIS — G30.9 ALZHEIMER'S DISEASE (H): Primary | ICD-10-CM

## 2025-02-04 DIAGNOSIS — F33.40 MAJOR DEPRESSIVE DISORDER, RECURRENT, IN REMISSION, UNSPECIFIED: ICD-10-CM

## 2025-02-04 DIAGNOSIS — D63.8 ANEMIA IN OTHER CHRONIC DISEASES CLASSIFIED ELSEWHERE: ICD-10-CM

## 2025-02-04 DIAGNOSIS — F41.1 GENERALIZED ANXIETY DISORDER: ICD-10-CM

## 2025-02-04 DIAGNOSIS — S42.201D CLOSED FRACTURE OF PROXIMAL END OF RIGHT HUMERUS WITH ROUTINE HEALING, UNSPECIFIED FRACTURE MORPHOLOGY, SUBSEQUENT ENCOUNTER: ICD-10-CM

## 2025-02-04 PROCEDURE — 99309 SBSQ NF CARE MODERATE MDM 30: CPT | Performed by: NURSE PRACTITIONER

## 2025-02-04 NOTE — LETTER
2/4/2025      Bushra Mccoy  [address]  [place] [address]        [facility]    Code Status:  FULL CODE   Visit Type:   Chief Complaint   Patient presents with     Tcu Follow Up     Facility:  [facility] ([facility]) [[id number]]         HPI: Bushra Mccoy is a 84 year old female I am seeing today for follow up on  the TCU.  Past medical history includes Alzheimer's dementia, overactive bladder and chronic anemia with low platelets. Patient with recent fall sustaining a right humerus fracture.  He was seen by Ortho and splinted.  Pain control with Tylenol.  Imaging done at the ER found incidentally a thyroid nodule.  CT showed right thyroid lobe hypodense nodule.  Suggested follow-up ultrasound.    Transitional Care Course: On today's visit patient is sitting up in wheelchair. Her daughter is present on exam. They have just returned from follow up with Ortho. Right humerus fx. She continues with conservative management with sling. Pain controlled with tylenol. Pt with underlying anxiety and insomnia. She is continued on her home fluoxetine. Mood appears stable. Insomnia. She was trialed on Melatonin but had nightmares. This was discontinued. Per the nursing staff and pt she is sleeping well currently. Daughter asked why her mom was denied placement in [facility]. I am not sure however pt is currently not able to bear weight on her RUE and pt has underlying cognitive impairment with recent SLUMS 14/30 indicating moderate impairment. This was reviewed. Message also left with nursing who updated SW to contact family.     Assessment/Plan:     Right humerus neck fracture  -Xrays show an apparent subtle linear lucency within the cortex of the lateral aspect of the right humeral neck and apparent foreshortening of the humeral neck,   -CT shows impacted fracture involving the surgical neck of the proximal right humerus.   -Orthopedics consult patient placed in sling.  Positive  CMS.  -Nonweightbearing right upper extremity.  -Tylenol 650 3 times daily.  -Repeat Follow-up with Ortho in 2 weeks. Follow up today. Continues in sling.      Alzheimer's dementia  Depression with anxiety   Insomnia   -SLUMS 14/30  -Donepezil 23 mg nightly.  -Fluoxetine 40 mg every morning. (This could be contributory to orthostatic hypotension.) Monitor.  -unable to tolerate melatonin. Discontinue.    -pt was using lorazepam at home however discontinued due to falls and orthostatic hypotension.     Overactive bladder  -tolterodine 4 mg Q AM.     Orthostatic Hypotension  ~ 20 pt drop in bp with Orthostatic bp checks initially.  Pt asymptomatic. BP now within normal limits.   -Fluoxetine may be contributory as well as poor oral intake.   -Give 240 ml of H2O with each med pass QID.   -TEDS on am/off HS for bp support.      Chronic anemia/low platelets   Low-grade B Cell lymphoma  -Had a bone marrow biopsy 2023  -Bone marrow study shows low-grade B-cell lymphoma  -Follow up CBC with hgb 9.1.   -Follows with  oncology clinic     Thyroid nodule   -Incidentally found on CT.   -Follow-up out pt with PCP for US.       Active Ambulatory Problems     Diagnosis Date Noted     Thyroid nodule 01/19/2025     Fall at home, initial encounter 01/19/2025     Closed fracture of proximal end of right humerus, unspecified fracture morphology, initial encounter 01/19/2025     Drug-induced constipation 01/20/2025     Acute vaginitis 09/26/2023     Generalized anxiety disorder 04/24/2023     Alzheimer's disease (H) 09/26/2023     Overactive bladder 09/26/2023     Diverticular disease of colon 11/07/2012     Diverticular disease of large intestine 12/07/2021     History of colonic polyps 12/09/2021     Major depressive disorder, recurrent, in remission, unspecified 04/24/2023     Polyp of colon 12/07/2021     Seasonal allergies 04/24/2023     Resolved Ambulatory Problems     Diagnosis Date Noted     No Resolved Ambulatory Problems     No  Additional Past Medical History     Allergies   Allergen Reactions     Bactrim [Sulfamethoxazole-Trimethoprim] Rash     Imipramine Unknown     Ofloxacin Unknown     Penicillins Unknown     Quinolones Unknown     Sulfa Antibiotics Rash     Tricyclic Antidepressants Unknown       All Meds and Allergies reviewed in the record at the facility and is the most up-to-date.  Current Outpatient Medications   Medication Sig Dispense Refill     acetaminophen (TYLENOL) 325 MG tablet Take 2 tablets (650 mg) by mouth 3 times daily.       atorvastatin (LIPITOR) 20 MG tablet Take 20 mg by mouth every evening.       donepezil (ARICEPT) 23 MG tablet Take 1 tablet by mouth at bedtime.       estradiol (ESTRACE) 0.1 MG/GM vaginal cream Place vaginally three times a week.       FLUoxetine (PROZAC) 40 MG capsule Take 40 mg by mouth daily.       ipratropium (ATROVENT) 0.03 % nasal spray Spray 2 sprays into both nostrils 3 times daily.       multivitamin w/minerals (THERA-VIT-M) tablet Take 1 tablet by mouth daily.       oxyCODONE (ROXICODONE) 5 MG tablet Take 1 tablet (5 mg) by mouth every 4 hours as needed for severe pain. 20 tablet 0     senna-docusate (SENOKOT-S/PERICOLACE) 8.6-50 MG tablet Take 1 tablet by mouth 2 times daily as needed for constipation.       tolterodine ER (DETROL LA) 4 MG 24 hr capsule Take 4 mg by mouth daily.       No current facility-administered medications for this visit.       REVIEW OF SYSTEMS:   10 point review of systems reviewed and pertinent positives in the HPI.     PHYSICAL EXAMINATION:  Physical Exam     Vital signs: BP (!) 155/64   Pulse 69   Temp 97.5  F (36.4  C)   Resp 16   Wt 48.1 kg (106 lb)   SpO2 97%   BMI 22.15 kg/m    General: Awake, Alert, oriented x3, sitting up in wheelchair, appropriately, follows simple commands, conversant  HEENT:Pink conjunctiva, moist oral mucosa  NECK: Supple  BACK: No kyphosis of the thoracic spine  EXTREMITIES: moves both upper and lower with limitation to  RUE, RUE in sling + CMS, no pedal edema, no calf tenderness  SKIN: Warm and dry  NEUROLOGIC: Intact, pulses palpable  PSYCHIATRIC: Cognitive impairment noted however pt answering questions appropriately.       Labs:  All labs reviewed in the nursing home record and Epic   @  Lab Results   Component Value Date    WBC 7.1 01/20/2025     Lab Results   Component Value Date    RBC 2.98 01/20/2025     Lab Results   Component Value Date    HGB 9.5 01/20/2025     Lab Results   Component Value Date    HCT 28.7 01/20/2025     Lab Results   Component Value Date    MCV 96 01/20/2025     Lab Results   Component Value Date    MCH 31.9 01/20/2025     Lab Results   Component Value Date    MCHC 33.1 01/20/2025     Lab Results   Component Value Date    RDW 12.6 01/20/2025     Lab Results   Component Value Date    PLT 83 01/20/2025        @Last Comprehensive Metabolic Panel:  Sodium   Date Value Ref Range Status   01/22/2025 141 135 - 145 mmol/L Final     Potassium   Date Value Ref Range Status   01/22/2025 3.9 3.4 - 5.3 mmol/L Final   10/22/2021 3.9 3.5 - 5.0 mmol/L Final     Chloride   Date Value Ref Range Status   01/22/2025 107 98 - 107 mmol/L Final   10/22/2021 110 (H) 98 - 107 mmol/L Final     Carbon Dioxide (CO2)   Date Value Ref Range Status   01/22/2025 25 22 - 29 mmol/L Final   10/22/2021 26 22 - 31 mmol/L Final     Anion Gap   Date Value Ref Range Status   01/22/2025 9 7 - 15 mmol/L Final   10/22/2021 9 5 - 18 mmol/L Final     Glucose   Date Value Ref Range Status   01/22/2025 106 (H) 70 - 99 mg/dL Final   10/22/2021 103 70 - 125 mg/dL Final     Urea Nitrogen   Date Value Ref Range Status   01/22/2025 26.0 (H) 8.0 - 23.0 mg/dL Final   10/22/2021 16 8 - 28 mg/dL Final     Creatinine   Date Value Ref Range Status   01/22/2025 0.86 0.51 - 0.95 mg/dL Final     GFR Estimate   Date Value Ref Range Status   01/22/2025 67 >60 mL/min/1.73m2 Final   08/25/2020 59 (L) >60 mL/min/1.73m2 Final     Calcium   Date Value Ref Range Status    01/22/2025 9.4 8.8 - 10.4 mg/dL Final     Comment:     Reference intervals for this test were updated on 7/16/2024 to reflect our healthy population more accurately. There may be differences in the flagging of prior results with similar values performed with this method. Those prior results can be interpreted in the context of the updated reference intervals.         This note has been dictated using voice recognition software. Any grammatical or context distortions are unintentional and inherent to the software    Electronically signed by: Lizette Carbajal CNP       Sincerely,        Lizette Carbajal NP    Electronically signed

## 2025-02-05 NOTE — PROGRESS NOTES
[facility] GERIATRIC SERVICES    Code Status:  FULL CODE   Visit Type:   Chief Complaint   Patient presents with    Tcu Follow Up     Facility:  [facility] ([facility]) [[id number]]         HPI: Bushra Mccoy is a 84 year old female I am seeing today for follow up on  the TCU.  Past medical history includes Alzheimer's dementia, overactive bladder and chronic anemia with low platelets. Patient with recent fall sustaining a right humerus fracture.  He was seen by Ortho and splinted.  Pain control with Tylenol.  Imaging done at the ER found incidentally a thyroid nodule.  CT showed right thyroid lobe hypodense nodule.  Suggested follow-up ultrasound.    Transitional Care Course: On today's visit patient is sitting up in wheelchair. Her daughter is present on exam. They have just returned from follow up with Ortho. Right humerus fx. She continues with conservative management with sling. Pain controlled with tylenol. Pt with underlying anxiety and insomnia. She is continued on her home fluoxetine. Mood appears stable. Insomnia. She was trialed on Melatonin but had nightmares. This was discontinued. Per the nursing staff and pt she is sleeping well currently. Daughter asked why her mom was denied placement in [facility]. I am not sure however pt is currently not able to bear weight on her RUE and pt has underlying cognitive impairment with recent SLUMS 14/30 indicating moderate impairment. This was reviewed. Message also left with nursing who updated SW to contact family.     Assessment/Plan:     Right humerus neck fracture  -Xrays show an apparent subtle linear lucency within the cortex of the lateral aspect of the right humeral neck and apparent foreshortening of the humeral neck,   -CT shows impacted fracture involving the surgical neck of the proximal right humerus.   -Orthopedics consult patient placed in sling.  Positive CMS.  -Nonweightbearing right upper extremity.  -Tylenol 650 3 times daily.  -Repeat  Follow-up with Ortho in 2 weeks. Follow up today. Continues in sling.      Alzheimer's dementia  Depression with anxiety   Insomnia   -SLUMS 14/30  -Donepezil 23 mg nightly.  -Fluoxetine 40 mg every morning. (This could be contributory to orthostatic hypotension.) Monitor.  -unable to tolerate melatonin. Discontinue.    -pt was using lorazepam at home however discontinued due to falls and orthostatic hypotension.     Overactive bladder  -tolterodine 4 mg Q AM.     Orthostatic Hypotension  ~ 20 pt drop in bp with Orthostatic bp checks initially.  Pt asymptomatic. BP now within normal limits.   -Fluoxetine may be contributory as well as poor oral intake.   -Give 240 ml of H2O with each med pass QID.   -TEDS on am/off HS for bp support.      Chronic anemia/low platelets   Low-grade B Cell lymphoma  -Had a bone marrow biopsy 2023  -Bone marrow study shows low-grade B-cell lymphoma  -Follow up CBC with hgb 9.1.   -Follows with  oncology clinic     Thyroid nodule   -Incidentally found on CT.   -Follow-up out pt with PCP for US.       Active Ambulatory Problems     Diagnosis Date Noted    Thyroid nodule 01/19/2025    Fall at home, initial encounter 01/19/2025    Closed fracture of proximal end of right humerus, unspecified fracture morphology, initial encounter 01/19/2025    Drug-induced constipation 01/20/2025    Acute vaginitis 09/26/2023    Generalized anxiety disorder 04/24/2023    Alzheimer's disease (H) 09/26/2023    Overactive bladder 09/26/2023    Diverticular disease of colon 11/07/2012    Diverticular disease of large intestine 12/07/2021    History of colonic polyps 12/09/2021    Major depressive disorder, recurrent, in remission, unspecified 04/24/2023    Polyp of colon 12/07/2021    Seasonal allergies 04/24/2023     Resolved Ambulatory Problems     Diagnosis Date Noted    No Resolved Ambulatory Problems     No Additional Past Medical History     Allergies   Allergen Reactions    Bactrim  [Sulfamethoxazole-Trimethoprim] Rash    Imipramine Unknown    Ofloxacin Unknown    Penicillins Unknown    Quinolones Unknown    Sulfa Antibiotics Rash    Tricyclic Antidepressants Unknown       All Meds and Allergies reviewed in the record at the facility and is the most up-to-date.  Current Outpatient Medications   Medication Sig Dispense Refill    acetaminophen (TYLENOL) 325 MG tablet Take 2 tablets (650 mg) by mouth 3 times daily.      atorvastatin (LIPITOR) 20 MG tablet Take 20 mg by mouth every evening.      donepezil (ARICEPT) 23 MG tablet Take 1 tablet by mouth at bedtime.      estradiol (ESTRACE) 0.1 MG/GM vaginal cream Place vaginally three times a week.      FLUoxetine (PROZAC) 40 MG capsule Take 40 mg by mouth daily.      ipratropium (ATROVENT) 0.03 % nasal spray Spray 2 sprays into both nostrils 3 times daily.      multivitamin w/minerals (THERA-VIT-M) tablet Take 1 tablet by mouth daily.      oxyCODONE (ROXICODONE) 5 MG tablet Take 1 tablet (5 mg) by mouth every 4 hours as needed for severe pain. 20 tablet 0    senna-docusate (SENOKOT-S/PERICOLACE) 8.6-50 MG tablet Take 1 tablet by mouth 2 times daily as needed for constipation.      tolterodine ER (DETROL LA) 4 MG 24 hr capsule Take 4 mg by mouth daily.       No current facility-administered medications for this visit.       REVIEW OF SYSTEMS:   10 point review of systems reviewed and pertinent positives in the HPI.     PHYSICAL EXAMINATION:  Physical Exam     Vital signs: BP (!) 155/64   Pulse 69   Temp 97.5  F (36.4  C)   Resp 16   Wt 48.1 kg (106 lb)   SpO2 97%   BMI 22.15 kg/m    General: Awake, Alert, oriented x3, sitting up in wheelchair, appropriately, follows simple commands, conversant  HEENT:Pink conjunctiva, moist oral mucosa  NECK: Supple  BACK: No kyphosis of the thoracic spine  EXTREMITIES: moves both upper and lower with limitation to RUE, RUE in sling + CMS, no pedal edema, no calf tenderness  SKIN: Warm and dry  NEUROLOGIC:  Intact, pulses palpable  PSYCHIATRIC: Cognitive impairment noted however pt answering questions appropriately.       Labs:  All labs reviewed in the nursing home record and Epic   @  Lab Results   Component Value Date    WBC 7.1 01/20/2025     Lab Results   Component Value Date    RBC 2.98 01/20/2025     Lab Results   Component Value Date    HGB 9.5 01/20/2025     Lab Results   Component Value Date    HCT 28.7 01/20/2025     Lab Results   Component Value Date    MCV 96 01/20/2025     Lab Results   Component Value Date    MCH 31.9 01/20/2025     Lab Results   Component Value Date    MCHC 33.1 01/20/2025     Lab Results   Component Value Date    RDW 12.6 01/20/2025     Lab Results   Component Value Date    PLT 83 01/20/2025        @Last Comprehensive Metabolic Panel:  Sodium   Date Value Ref Range Status   01/22/2025 141 135 - 145 mmol/L Final     Potassium   Date Value Ref Range Status   01/22/2025 3.9 3.4 - 5.3 mmol/L Final   10/22/2021 3.9 3.5 - 5.0 mmol/L Final     Chloride   Date Value Ref Range Status   01/22/2025 107 98 - 107 mmol/L Final   10/22/2021 110 (H) 98 - 107 mmol/L Final     Carbon Dioxide (CO2)   Date Value Ref Range Status   01/22/2025 25 22 - 29 mmol/L Final   10/22/2021 26 22 - 31 mmol/L Final     Anion Gap   Date Value Ref Range Status   01/22/2025 9 7 - 15 mmol/L Final   10/22/2021 9 5 - 18 mmol/L Final     Glucose   Date Value Ref Range Status   01/22/2025 106 (H) 70 - 99 mg/dL Final   10/22/2021 103 70 - 125 mg/dL Final     Urea Nitrogen   Date Value Ref Range Status   01/22/2025 26.0 (H) 8.0 - 23.0 mg/dL Final   10/22/2021 16 8 - 28 mg/dL Final     Creatinine   Date Value Ref Range Status   01/22/2025 0.86 0.51 - 0.95 mg/dL Final     GFR Estimate   Date Value Ref Range Status   01/22/2025 67 >60 mL/min/1.73m2 Final   08/25/2020 59 (L) >60 mL/min/1.73m2 Final     Calcium   Date Value Ref Range Status   01/22/2025 9.4 8.8 - 10.4 mg/dL Final     Comment:     Reference intervals for this test  were updated on 7/16/2024 to reflect our healthy population more accurately. There may be differences in the flagging of prior results with similar values performed with this method. Those prior results can be interpreted in the context of the updated reference intervals.         This note has been dictated using voice recognition software. Any grammatical or context distortions are unintentional and inherent to the software    Electronically signed by: Lizette Carbajal CNP

## 2025-02-06 ENCOUNTER — TRANSITIONAL CARE UNIT VISIT (OUTPATIENT)
Dept: GERIATRICS | Facility: CLINIC | Age: 85
End: 2025-02-06
Payer: COMMERCIAL

## 2025-02-06 VITALS
BODY MASS INDEX: 22.15 KG/M2 | DIASTOLIC BLOOD PRESSURE: 50 MMHG | SYSTOLIC BLOOD PRESSURE: 113 MMHG | HEART RATE: 75 BPM | OXYGEN SATURATION: 97 % | WEIGHT: 106 LBS | RESPIRATION RATE: 16 BRPM | TEMPERATURE: 99.5 F

## 2025-02-06 DIAGNOSIS — F02.80 ALZHEIMER'S DISEASE (H): Primary | ICD-10-CM

## 2025-02-06 DIAGNOSIS — E04.1 THYROID NODULE: ICD-10-CM

## 2025-02-06 DIAGNOSIS — G30.9 ALZHEIMER'S DISEASE (H): Primary | ICD-10-CM

## 2025-02-06 DIAGNOSIS — D63.8 ANEMIA IN OTHER CHRONIC DISEASES CLASSIFIED ELSEWHERE: ICD-10-CM

## 2025-02-06 DIAGNOSIS — F33.40 MAJOR DEPRESSIVE DISORDER, RECURRENT, IN REMISSION, UNSPECIFIED: ICD-10-CM

## 2025-02-06 DIAGNOSIS — N32.81 OVERACTIVE BLADDER: ICD-10-CM

## 2025-02-06 DIAGNOSIS — F51.01 PRIMARY INSOMNIA: ICD-10-CM

## 2025-02-06 DIAGNOSIS — S42.201D CLOSED FRACTURE OF PROXIMAL END OF RIGHT HUMERUS WITH ROUTINE HEALING, UNSPECIFIED FRACTURE MORPHOLOGY, SUBSEQUENT ENCOUNTER: ICD-10-CM

## 2025-02-06 DIAGNOSIS — F41.1 GENERALIZED ANXIETY DISORDER: ICD-10-CM

## 2025-02-06 NOTE — PROGRESS NOTES
[facility] GERIATRIC SERVICES    Code Status:  FULL CODE   Visit Type:   Chief Complaint   Patient presents with    [facility] TCU Follow Up     Facility:  [facility] ([facility]) [[id number]]         HPI: Bushra Mccoy is a 84 year old female I am seeing today for follow up on  the TCU.  Past medical history includes Alzheimer's dementia, overactive bladder and chronic anemia with low platelets. Patient with recent fall sustaining a right humerus fracture.  He was seen by Ortho and splinted.  Pain control with Tylenol.  Imaging done at the ER found incidentally a thyroid nodule.  CT showed right thyroid lobe hypodense nodule.  Suggested follow-up ultrasound.    Transitional Care Course: On today's visit patient is sitting up in wheelchair. Right humerus fracture treated conservatively with sling. Pain controlled with tylenol. Underlying cognitive impairment. Hx of anxiety. Mood stable on fluoxetine. Insomnia. Pt no longer on lorazepam. She did not tolerate melatonin. She appears to be sleeping well without med according to nursing. BP satisfactory. Denies any dizziness or light headness.     Assessment/Plan:     Right humerus neck fracture  -Xrays show an apparent subtle linear lucency within the cortex of the lateral aspect of the right humeral neck and apparent foreshortening of the humeral neck,   -CT shows impacted fracture involving the surgical neck of the proximal right humerus.   -Orthopedics consult patient placed in sling.  Positive CMS.  -Nonweightbearing right upper extremity.  -Tylenol 650 3 times daily.  -Repeat Follow-up with Ortho in 2 weeks. Follow up today. Continues in sling.      Alzheimer's dementia  Depression with anxiety   Insomnia   -SLUMS 14/30  -Donepezil 23 mg nightly.  -Fluoxetine 40 mg every morning. (This could be contributory to orthostatic hypotension.) Monitor.  -unable to tolerate melatonin. Discontinue.    -pt was using lorazepam at home however discontinued due to falls and  orthostatic hypotension.     Overactive bladder  -tolterodine 4 mg Q AM.      Chronic anemia/low platelets   Low-grade B Cell lymphoma  -Had a bone marrow biopsy 2023  -Bone marrow study shows low-grade B-cell lymphoma  -Follow up CBC with hgb 9.1.   -Follows with  oncology clinic     Thyroid nodule   -Incidentally found on CT.   -Follow-up out pt with PCP for US.       Active Ambulatory Problems     Diagnosis Date Noted    Thyroid nodule 01/19/2025    Fall at home, initial encounter 01/19/2025    Closed fracture of proximal end of right humerus, unspecified fracture morphology, initial encounter 01/19/2025    Drug-induced constipation 01/20/2025    Acute vaginitis 09/26/2023    Generalized anxiety disorder 04/24/2023    Alzheimer's disease (H) 09/26/2023    Overactive bladder 09/26/2023    Diverticular disease of colon 11/07/2012    Diverticular disease of large intestine 12/07/2021    History of colonic polyps 12/09/2021    Major depressive disorder, recurrent, in remission, unspecified 04/24/2023    Polyp of colon 12/07/2021    Seasonal allergies 04/24/2023     Resolved Ambulatory Problems     Diagnosis Date Noted    No Resolved Ambulatory Problems     No Additional Past Medical History     Allergies   Allergen Reactions    Bactrim [Sulfamethoxazole-Trimethoprim] Rash    Imipramine Unknown    Ofloxacin Unknown    Penicillins Unknown    Quinolones Unknown    Sulfa Antibiotics Rash    Tricyclic Antidepressants Unknown       All Meds and Allergies reviewed in the record at the facility and is the most up-to-date.  Current Outpatient Medications   Medication Sig Dispense Refill    acetaminophen (TYLENOL) 325 MG tablet Take 2 tablets (650 mg) by mouth 3 times daily.      atorvastatin (LIPITOR) 20 MG tablet Take 20 mg by mouth every evening.      donepezil (ARICEPT) 23 MG tablet Take 1 tablet by mouth at bedtime.      estradiol (ESTRACE) 0.1 MG/GM vaginal cream Place vaginally three times a week.      FLUoxetine  (PROZAC) 40 MG capsule Take 40 mg by mouth daily.      ipratropium (ATROVENT) 0.03 % nasal spray Spray 2 sprays into both nostrils 3 times daily.      multivitamin w/minerals (THERA-VIT-M) tablet Take 1 tablet by mouth daily.      senna-docusate (SENOKOT-S/PERICOLACE) 8.6-50 MG tablet Take 1 tablet by mouth 2 times daily as needed for constipation.      tolterodine ER (DETROL LA) 4 MG 24 hr capsule Take 4 mg by mouth daily.       No current facility-administered medications for this visit.       REVIEW OF SYSTEMS:   10 point review of systems reviewed and pertinent positives in the HPI.     PHYSICAL EXAMINATION:  Physical Exam     Vital signs: /50   Pulse 75   Temp 99.5  F (37.5  C)   Resp 16   Wt 48.1 kg (106 lb)   SpO2 97%   BMI 22.15 kg/m    General: Awake, Alert, oriented x3, sitting up in wheelchair, appropriately, follows simple commands, conversant  HEENT:Pink conjunctiva, moist oral mucosa  NECK: Supple  BACK: No kyphosis of the thoracic spine  EXTREMITIES: moves both upper and lower with limitation to RUE, RUE in sling + CMS, no pedal edema, no calf tenderness  SKIN: Warm and dry  NEUROLOGIC: Intact, pulses palpable  PSYCHIATRIC: Cognitive impairment noted however pt answering questions appropriately.       Labs:  All labs reviewed in the nursing home record and Epic   @  Lab Results   Component Value Date    WBC 7.1 01/20/2025     Lab Results   Component Value Date    RBC 2.98 01/20/2025     Lab Results   Component Value Date    HGB 9.5 01/20/2025     Lab Results   Component Value Date    HCT 28.7 01/20/2025     Lab Results   Component Value Date    MCV 96 01/20/2025     Lab Results   Component Value Date    MCH 31.9 01/20/2025     Lab Results   Component Value Date    MCHC 33.1 01/20/2025     Lab Results   Component Value Date    RDW 12.6 01/20/2025     Lab Results   Component Value Date    PLT 83 01/20/2025        @Last Comprehensive Metabolic Panel:  Sodium   Date Value Ref Range Status    01/22/2025 141 135 - 145 mmol/L Final     Potassium   Date Value Ref Range Status   01/22/2025 3.9 3.4 - 5.3 mmol/L Final   10/22/2021 3.9 3.5 - 5.0 mmol/L Final     Chloride   Date Value Ref Range Status   01/22/2025 107 98 - 107 mmol/L Final   10/22/2021 110 (H) 98 - 107 mmol/L Final     Carbon Dioxide (CO2)   Date Value Ref Range Status   01/22/2025 25 22 - 29 mmol/L Final   10/22/2021 26 22 - 31 mmol/L Final     Anion Gap   Date Value Ref Range Status   01/22/2025 9 7 - 15 mmol/L Final   10/22/2021 9 5 - 18 mmol/L Final     Glucose   Date Value Ref Range Status   01/22/2025 106 (H) 70 - 99 mg/dL Final   10/22/2021 103 70 - 125 mg/dL Final     Urea Nitrogen   Date Value Ref Range Status   01/22/2025 26.0 (H) 8.0 - 23.0 mg/dL Final   10/22/2021 16 8 - 28 mg/dL Final     Creatinine   Date Value Ref Range Status   01/22/2025 0.86 0.51 - 0.95 mg/dL Final     GFR Estimate   Date Value Ref Range Status   01/22/2025 67 >60 mL/min/1.73m2 Final   08/25/2020 59 (L) >60 mL/min/1.73m2 Final     Calcium   Date Value Ref Range Status   01/22/2025 9.4 8.8 - 10.4 mg/dL Final     Comment:     Reference intervals for this test were updated on 7/16/2024 to reflect our healthy population more accurately. There may be differences in the flagging of prior results with similar values performed with this method. Those prior results can be interpreted in the context of the updated reference intervals.         This note has been dictated using voice recognition software. Any grammatical or context distortions are unintentional and inherent to the software    Electronically signed by: Lizette Carbajal, CNP

## 2025-02-06 NOTE — LETTER
2/6/2025      Bushra Mccoy  [address]  [place] [address]        [facility]    Code Status:  FULL CODE   Visit Type:   Chief Complaint   Patient presents with     [facility] TCU Follow Up     Facility:  [facility] [place] ([facility]) [[id number]]         HPI: Bushra Mccoy is a 84 year old female I am seeing today for follow up on  the TCU.  Past medical history includes Alzheimer's dementia, overactive bladder and chronic anemia with low platelets. Patient with recent fall sustaining a right humerus fracture.  He was seen by Ortho and splinted.  Pain control with Tylenol.  Imaging done at the ER found incidentally a thyroid nodule.  CT showed right thyroid lobe hypodense nodule.  Suggested follow-up ultrasound.    Transitional Care Course: On today's visit patient is sitting up in wheelchair. Right humerus fracture treated conservatively with sling. Pain controlled with tylenol. Underlying cognitive impairment. Hx of anxiety. Mood stable on fluoxetine. Insomnia. Pt no longer on lorazepam. She did not tolerate melatonin. She appears to be sleeping well without med according to nursing. BP satisfactory. Denies any dizziness or light headness.     Assessment/Plan:     Right humerus neck fracture  -Xrays show an apparent subtle linear lucency within the cortex of the lateral aspect of the right humeral neck and apparent foreshortening of the humeral neck,   -CT shows impacted fracture involving the surgical neck of the proximal right humerus.   -Orthopedics consult patient placed in sling.  Positive CMS.  -Nonweightbearing right upper extremity.  -Tylenol 650 3 times daily.  -Repeat Follow-up with Ortho in 2 weeks. Follow up today. Continues in sling.      Alzheimer's dementia  Depression with anxiety   Insomnia   -SLUMS 14/30  -Donepezil 23 mg nightly.  -Fluoxetine 40 mg every morning. (This could be contributory to orthostatic hypotension.) Monitor.  -unable to tolerate melatonin.  Discontinue.    -pt was using lorazepam at home however discontinued due to falls and orthostatic hypotension.     Overactive bladder  -tolterodine 4 mg Q AM.      Chronic anemia/low platelets   Low-grade B Cell lymphoma  -Had a bone marrow biopsy 2023  -Bone marrow study shows low-grade B-cell lymphoma  -Follow up CBC with hgb 9.1.   -Follows with  oncology clinic     Thyroid nodule   -Incidentally found on CT.   -Follow-up out pt with PCP for US.       Active Ambulatory Problems     Diagnosis Date Noted     Thyroid nodule 01/19/2025     Fall at home, initial encounter 01/19/2025     Closed fracture of proximal end of right humerus, unspecified fracture morphology, initial encounter 01/19/2025     Drug-induced constipation 01/20/2025     Acute vaginitis 09/26/2023     Generalized anxiety disorder 04/24/2023     Alzheimer's disease (H) 09/26/2023     Overactive bladder 09/26/2023     Diverticular disease of colon 11/07/2012     Diverticular disease of large intestine 12/07/2021     History of colonic polyps 12/09/2021     Major depressive disorder, recurrent, in remission, unspecified 04/24/2023     Polyp of colon 12/07/2021     Seasonal allergies 04/24/2023     Resolved Ambulatory Problems     Diagnosis Date Noted     No Resolved Ambulatory Problems     No Additional Past Medical History     Allergies   Allergen Reactions     Bactrim [Sulfamethoxazole-Trimethoprim] Rash     Imipramine Unknown     Ofloxacin Unknown     Penicillins Unknown     Quinolones Unknown     Sulfa Antibiotics Rash     Tricyclic Antidepressants Unknown       All Meds and Allergies reviewed in the record at the facility and is the most up-to-date.  Current Outpatient Medications   Medication Sig Dispense Refill     acetaminophen (TYLENOL) 325 MG tablet Take 2 tablets (650 mg) by mouth 3 times daily.       atorvastatin (LIPITOR) 20 MG tablet Take 20 mg by mouth every evening.       donepezil (ARICEPT) 23 MG tablet Take 1 tablet by mouth at  bedtime.       estradiol (ESTRACE) 0.1 MG/GM vaginal cream Place vaginally three times a week.       FLUoxetine (PROZAC) 40 MG capsule Take 40 mg by mouth daily.       ipratropium (ATROVENT) 0.03 % nasal spray Spray 2 sprays into both nostrils 3 times daily.       multivitamin w/minerals (THERA-VIT-M) tablet Take 1 tablet by mouth daily.       senna-docusate (SENOKOT-S/PERICOLACE) 8.6-50 MG tablet Take 1 tablet by mouth 2 times daily as needed for constipation.       tolterodine ER (DETROL LA) 4 MG 24 hr capsule Take 4 mg by mouth daily.       No current facility-administered medications for this visit.       REVIEW OF SYSTEMS:   10 point review of systems reviewed and pertinent positives in the HPI.     PHYSICAL EXAMINATION:  Physical Exam     Vital signs: /50   Pulse 75   Temp 99.5  F (37.5  C)   Resp 16   Wt 48.1 kg (106 lb)   SpO2 97%   BMI 22.15 kg/m    General: Awake, Alert, oriented x3, sitting up in wheelchair, appropriately, follows simple commands, conversant  HEENT:Pink conjunctiva, moist oral mucosa  NECK: Supple  BACK: No kyphosis of the thoracic spine  EXTREMITIES: moves both upper and lower with limitation to RUE, RUE in sling + CMS, no pedal edema, no calf tenderness  SKIN: Warm and dry  NEUROLOGIC: Intact, pulses palpable  PSYCHIATRIC: Cognitive impairment noted however pt answering questions appropriately.       Labs:  All labs reviewed in the nursing home record and Epic   @  Lab Results   Component Value Date    WBC 7.1 01/20/2025     Lab Results   Component Value Date    RBC 2.98 01/20/2025     Lab Results   Component Value Date    HGB 9.5 01/20/2025     Lab Results   Component Value Date    HCT 28.7 01/20/2025     Lab Results   Component Value Date    MCV 96 01/20/2025     Lab Results   Component Value Date    MCH 31.9 01/20/2025     Lab Results   Component Value Date    MCHC 33.1 01/20/2025     Lab Results   Component Value Date    RDW 12.6 01/20/2025     Lab Results   Component  Value Date    PLT 83 01/20/2025        @Last Comprehensive Metabolic Panel:  Sodium   Date Value Ref Range Status   01/22/2025 141 135 - 145 mmol/L Final     Potassium   Date Value Ref Range Status   01/22/2025 3.9 3.4 - 5.3 mmol/L Final   10/22/2021 3.9 3.5 - 5.0 mmol/L Final     Chloride   Date Value Ref Range Status   01/22/2025 107 98 - 107 mmol/L Final   10/22/2021 110 (H) 98 - 107 mmol/L Final     Carbon Dioxide (CO2)   Date Value Ref Range Status   01/22/2025 25 22 - 29 mmol/L Final   10/22/2021 26 22 - 31 mmol/L Final     Anion Gap   Date Value Ref Range Status   01/22/2025 9 7 - 15 mmol/L Final   10/22/2021 9 5 - 18 mmol/L Final     Glucose   Date Value Ref Range Status   01/22/2025 106 (H) 70 - 99 mg/dL Final   10/22/2021 103 70 - 125 mg/dL Final     Urea Nitrogen   Date Value Ref Range Status   01/22/2025 26.0 (H) 8.0 - 23.0 mg/dL Final   10/22/2021 16 8 - 28 mg/dL Final     Creatinine   Date Value Ref Range Status   01/22/2025 0.86 0.51 - 0.95 mg/dL Final     GFR Estimate   Date Value Ref Range Status   01/22/2025 67 >60 mL/min/1.73m2 Final   08/25/2020 59 (L) >60 mL/min/1.73m2 Final     Calcium   Date Value Ref Range Status   01/22/2025 9.4 8.8 - 10.4 mg/dL Final     Comment:     Reference intervals for this test were updated on 7/16/2024 to reflect our healthy population more accurately. There may be differences in the flagging of prior results with similar values performed with this method. Those prior results can be interpreted in the context of the updated reference intervals.         This note has been dictated using voice recognition software. Any grammatical or context distortions are unintentional and inherent to the software    Electronically signed by: Lizette Carbajal CNP       Sincerely,        Lizette Carbajal NP    Electronically signed

## 2025-02-11 ENCOUNTER — TRANSITIONAL CARE UNIT VISIT (OUTPATIENT)
Dept: GERIATRICS | Facility: CLINIC | Age: 85
End: 2025-02-11
Payer: COMMERCIAL

## 2025-02-11 VITALS
BODY MASS INDEX: 22.29 KG/M2 | DIASTOLIC BLOOD PRESSURE: 55 MMHG | WEIGHT: 106.2 LBS | RESPIRATION RATE: 16 BRPM | OXYGEN SATURATION: 97 % | HEIGHT: 58 IN | SYSTOLIC BLOOD PRESSURE: 117 MMHG | HEART RATE: 75 BPM | TEMPERATURE: 98 F

## 2025-02-11 DIAGNOSIS — F41.1 GENERALIZED ANXIETY DISORDER: ICD-10-CM

## 2025-02-11 DIAGNOSIS — F02.80 ALZHEIMER'S DISEASE (H): Primary | ICD-10-CM

## 2025-02-11 DIAGNOSIS — F33.40 MAJOR DEPRESSIVE DISORDER, RECURRENT, IN REMISSION, UNSPECIFIED: ICD-10-CM

## 2025-02-11 DIAGNOSIS — N32.81 OVERACTIVE BLADDER: ICD-10-CM

## 2025-02-11 DIAGNOSIS — S42.201D CLOSED FRACTURE OF PROXIMAL END OF RIGHT HUMERUS WITH ROUTINE HEALING, UNSPECIFIED FRACTURE MORPHOLOGY, SUBSEQUENT ENCOUNTER: ICD-10-CM

## 2025-02-11 DIAGNOSIS — D63.8 ANEMIA IN OTHER CHRONIC DISEASES CLASSIFIED ELSEWHERE: ICD-10-CM

## 2025-02-11 DIAGNOSIS — G30.9 ALZHEIMER'S DISEASE (H): Primary | ICD-10-CM

## 2025-02-11 DIAGNOSIS — E04.1 THYROID NODULE: ICD-10-CM

## 2025-02-11 PROCEDURE — 99309 SBSQ NF CARE MODERATE MDM 30: CPT | Performed by: NURSE PRACTITIONER

## 2025-02-11 NOTE — LETTER
2/11/2025      Bushra Mccoy  [address]  [place] [address]        [facility]    Code Status:  FULL CODE   Visit Type:   Chief Complaint   Patient presents with     TCU Follow Up     Facility:  [facility] ([facility]) [[id number]]         HPI: Bushra Mccoy is a 84 year old female I am seeing today for follow up on  the TCU.  Past medical history includes Alzheimer's dementia, overactive bladder and chronic anemia with low platelets. Patient with recent fall sustaining a right humerus fracture.  He was seen by Ortho and splinted.  Pain control with Tylenol.  Imaging done at the ER found incidentally a thyroid nodule.  CT showed right thyroid lobe hypodense nodule.  Suggested follow-up ultrasound.    Transitional Care Course: On today's visit patient is sitting up in wheelchair. Underlying cognitive impairment. Right humerus fracture treated conservatively with sling. Pain controlled with tylenol.  Pt seen earlier out in hallway ambulating with raymond. Hx of anxiety. Mood stable on fluoxetine. BP satisfactory. Denies any dizziness or light headness. Pt reports sleeping well.     Assessment/Plan:     Right humerus neck fracture  -Xrays show an apparent subtle linear lucency within the cortex of the lateral aspect of the right humeral neck and apparent foreshortening of the humeral neck,   -CT shows impacted fracture involving the surgical neck of the proximal right humerus.   -Orthopedics consult patient placed in sling.  Positive CMS.  -Nonweightbearing right upper extremity. Continues in sling.   -Tylenol 650 3 times daily.  -Repeat Follow-up with Ortho in 1 week.      Alzheimer's dementia  Depression with anxiety   Insomnia   -SLUMS 14/30  -Donepezil 23 mg nightly.  -Fluoxetine 40 mg every morning. (This could be contributory to orthostatic hypotension.) Monitor.  -unable to tolerate melatonin. Discontinue.    -pt was using lorazepam at home however discontinued due to  falls and orthostatic hypotension.     Overactive bladder  -tolterodine 4 mg Q AM.      Chronic anemia/low platelets   Low-grade B Cell lymphoma  -Had a bone marrow biopsy 2023  -Bone marrow study shows low-grade B-cell lymphoma  -Follow up CBC with hgb 9.1.   -Follows with  oncology clinic     Thyroid nodule   -Incidentally found on CT.   -Follow-up out pt with PCP for US.       Active Ambulatory Problems     Diagnosis Date Noted     Thyroid nodule 01/19/2025     Fall at home, initial encounter 01/19/2025     Closed fracture of proximal end of right humerus, unspecified fracture morphology, initial encounter 01/19/2025     Drug-induced constipation 01/20/2025     Acute vaginitis 09/26/2023     Generalized anxiety disorder 04/24/2023     Alzheimer's disease (H) 09/26/2023     Overactive bladder 09/26/2023     Diverticular disease of colon 11/07/2012     Diverticular disease of large intestine 12/07/2021     History of colonic polyps 12/09/2021     Major depressive disorder, recurrent, in remission, unspecified 04/24/2023     Polyp of colon 12/07/2021     Seasonal allergies 04/24/2023     Resolved Ambulatory Problems     Diagnosis Date Noted     No Resolved Ambulatory Problems     No Additional Past Medical History     Allergies   Allergen Reactions     Bactrim [Sulfamethoxazole-Trimethoprim] Rash     Imipramine Unknown     Ofloxacin Unknown     Penicillins Unknown     Quinolones Unknown     Sulfa Antibiotics Rash     Tricyclic Antidepressants Unknown       All Meds and Allergies reviewed in the record at the facility and is the most up-to-date.  Current Outpatient Medications   Medication Sig Dispense Refill     acetaminophen (TYLENOL) 325 MG tablet Take 2 tablets (650 mg) by mouth 3 times daily.       atorvastatin (LIPITOR) 20 MG tablet Take 20 mg by mouth every evening.       donepezil (ARICEPT) 23 MG tablet Take 1 tablet by mouth at bedtime.       estradiol (ESTRACE) 0.1 MG/GM vaginal cream Place vaginally three  "times a week.       FLUoxetine (PROZAC) 40 MG capsule Take 40 mg by mouth daily.       ipratropium (ATROVENT) 0.03 % nasal spray Spray 2 sprays into both nostrils 3 times daily.       multivitamin w/minerals (THERA-VIT-M) tablet Take 1 tablet by mouth daily.       senna-docusate (SENOKOT-S/PERICOLACE) 8.6-50 MG tablet Take 1 tablet by mouth 2 times daily as needed for constipation.       tolterodine ER (DETROL LA) 4 MG 24 hr capsule Take 4 mg by mouth daily.       No current facility-administered medications for this visit.       REVIEW OF SYSTEMS:   10 point review of systems reviewed and pertinent positives in the HPI.     PHYSICAL EXAMINATION:  Physical Exam     Vital signs: /55   Pulse 75   Temp 98  F (36.7  C)   Resp 16   Ht 1.473 m (4' 10\")   Wt 48.2 kg (106 lb 3.2 oz)   SpO2 97%   BMI 22.20 kg/m    General: Awake, Alert, oriented x3, sitting up in wheelchair, appropriately, follows simple commands, conversant  HEENT:Pink conjunctiva, moist oral mucosa  NECK: Supple  BACK: No kyphosis of the thoracic spine  EXTREMITIES: moves both upper and lower with limitation to RUE, RUE in sling + CMS, no pedal edema, no calf tenderness. Pt ambulating earlier with cane out in hallway with therapy.   SKIN: Warm and dry  NEUROLOGIC: Intact, pulses palpable  PSYCHIATRIC: Cognitive impairment noted however pt answering questions appropriately.       Labs:  All labs reviewed in the nursing home record and Epic   @  Lab Results   Component Value Date    WBC 7.1 01/20/2025     Lab Results   Component Value Date    RBC 2.98 01/20/2025     Lab Results   Component Value Date    HGB 9.5 01/20/2025     Lab Results   Component Value Date    HCT 28.7 01/20/2025     Lab Results   Component Value Date    MCV 96 01/20/2025     Lab Results   Component Value Date    MCH 31.9 01/20/2025     Lab Results   Component Value Date    MCHC 33.1 01/20/2025     Lab Results   Component Value Date    RDW 12.6 01/20/2025     Lab Results "   Component Value Date    PLT 83 01/20/2025        @Last Comprehensive Metabolic Panel:  Sodium   Date Value Ref Range Status   01/22/2025 141 135 - 145 mmol/L Final     Potassium   Date Value Ref Range Status   01/22/2025 3.9 3.4 - 5.3 mmol/L Final   10/22/2021 3.9 3.5 - 5.0 mmol/L Final     Chloride   Date Value Ref Range Status   01/22/2025 107 98 - 107 mmol/L Final   10/22/2021 110 (H) 98 - 107 mmol/L Final     Carbon Dioxide (CO2)   Date Value Ref Range Status   01/22/2025 25 22 - 29 mmol/L Final   10/22/2021 26 22 - 31 mmol/L Final     Anion Gap   Date Value Ref Range Status   01/22/2025 9 7 - 15 mmol/L Final   10/22/2021 9 5 - 18 mmol/L Final     Glucose   Date Value Ref Range Status   01/22/2025 106 (H) 70 - 99 mg/dL Final   10/22/2021 103 70 - 125 mg/dL Final     Urea Nitrogen   Date Value Ref Range Status   01/22/2025 26.0 (H) 8.0 - 23.0 mg/dL Final   10/22/2021 16 8 - 28 mg/dL Final     Creatinine   Date Value Ref Range Status   01/22/2025 0.86 0.51 - 0.95 mg/dL Final     GFR Estimate   Date Value Ref Range Status   01/22/2025 67 >60 mL/min/1.73m2 Final   08/25/2020 59 (L) >60 mL/min/1.73m2 Final     Calcium   Date Value Ref Range Status   01/22/2025 9.4 8.8 - 10.4 mg/dL Final     Comment:     Reference intervals for this test were updated on 7/16/2024 to reflect our healthy population more accurately. There may be differences in the flagging of prior results with similar values performed with this method. Those prior results can be interpreted in the context of the updated reference intervals.         This note has been dictated using voice recognition software. Any grammatical or context distortions are unintentional and inherent to the software    Electronically signed by: Lizette Carbajal CNP       Sincerely,        Lizette Carbajal NP    Electronically signed

## 2025-02-12 NOTE — PROGRESS NOTES
[facility]    Code Status:  FULL CODE   Visit Type:   Chief Complaint   Patient presents with    TCU Follow Up     Facility:  [facility] ([facility]) [[id number]]         HPI: Bushra Mccoy is a 84 year old female I am seeing today for follow up on  the TCU.  Past medical history includes Alzheimer's dementia, overactive bladder and chronic anemia with low platelets. Patient with recent fall sustaining a right humerus fracture.  He was seen by Ortho and splinted.  Pain control with Tylenol.  Imaging done at the ER found incidentally a thyroid nodule.  CT showed right thyroid lobe hypodense nodule.  Suggested follow-up ultrasound.    Transitional Care Course: On today's visit patient is sitting up in wheelchair. Underlying cognitive impairment. Right humerus fracture treated conservatively with sling. Pain controlled with tylenol.  Pt seen earlier out in hallway ambulating with raymond. Hx of anxiety. Mood stable on fluoxetine. BP satisfactory. Denies any dizziness or light headness. Pt reports sleeping well.     Assessment/Plan:     Right humerus neck fracture  -Xrays show an apparent subtle linear lucency within the cortex of the lateral aspect of the right humeral neck and apparent foreshortening of the humeral neck,   -CT shows impacted fracture involving the surgical neck of the proximal right humerus.   -Orthopedics consult patient placed in sling.  Positive CMS.  -Nonweightbearing right upper extremity. Continues in sling.   -Tylenol 650 3 times daily.  -Repeat Follow-up with Ortho in 1 week.      Alzheimer's dementia  Depression with anxiety   Insomnia   -SLUMS 14/30  -Donepezil 23 mg nightly.  -Fluoxetine 40 mg every morning. (This could be contributory to orthostatic hypotension.) Monitor.  -unable to tolerate melatonin. Discontinue.    -pt was using lorazepam at home however discontinued due to falls and orthostatic hypotension.     Overactive bladder  -tolterodine 4 mg Q AM.       Chronic anemia/low platelets   Low-grade B Cell lymphoma  -Had a bone marrow biopsy 2023  -Bone marrow study shows low-grade B-cell lymphoma  -Follow up CBC with hgb 9.1.   -Follows with  oncology clinic     Thyroid nodule   -Incidentally found on CT.   -Follow-up out pt with PCP for US.       Active Ambulatory Problems     Diagnosis Date Noted    Thyroid nodule 01/19/2025    Fall at home, initial encounter 01/19/2025    Closed fracture of proximal end of right humerus, unspecified fracture morphology, initial encounter 01/19/2025    Drug-induced constipation 01/20/2025    Acute vaginitis 09/26/2023    Generalized anxiety disorder 04/24/2023    Alzheimer's disease (H) 09/26/2023    Overactive bladder 09/26/2023    Diverticular disease of colon 11/07/2012    Diverticular disease of large intestine 12/07/2021    History of colonic polyps 12/09/2021    Major depressive disorder, recurrent, in remission, unspecified 04/24/2023    Polyp of colon 12/07/2021    Seasonal allergies 04/24/2023     Resolved Ambulatory Problems     Diagnosis Date Noted    No Resolved Ambulatory Problems     No Additional Past Medical History     Allergies   Allergen Reactions    Bactrim [Sulfamethoxazole-Trimethoprim] Rash    Imipramine Unknown    Ofloxacin Unknown    Penicillins Unknown    Quinolones Unknown    Sulfa Antibiotics Rash    Tricyclic Antidepressants Unknown       All Meds and Allergies reviewed in the record at the facility and is the most up-to-date.  Current Outpatient Medications   Medication Sig Dispense Refill    acetaminophen (TYLENOL) 325 MG tablet Take 2 tablets (650 mg) by mouth 3 times daily.      atorvastatin (LIPITOR) 20 MG tablet Take 20 mg by mouth every evening.      donepezil (ARICEPT) 23 MG tablet Take 1 tablet by mouth at bedtime.      estradiol (ESTRACE) 0.1 MG/GM vaginal cream Place vaginally three times a week.      FLUoxetine (PROZAC) 40 MG capsule Take 40 mg by mouth daily.      ipratropium (ATROVENT) 0.03  "% nasal spray Spray 2 sprays into both nostrils 3 times daily.      multivitamin w/minerals (THERA-VIT-M) tablet Take 1 tablet by mouth daily.      senna-docusate (SENOKOT-S/PERICOLACE) 8.6-50 MG tablet Take 1 tablet by mouth 2 times daily as needed for constipation.      tolterodine ER (DETROL LA) 4 MG 24 hr capsule Take 4 mg by mouth daily.       No current facility-administered medications for this visit.       REVIEW OF SYSTEMS:   10 point review of systems reviewed and pertinent positives in the HPI.     PHYSICAL EXAMINATION:  Physical Exam     Vital signs: /55   Pulse 75   Temp 98  F (36.7  C)   Resp 16   Ht 1.473 m (4' 10\")   Wt 48.2 kg (106 lb 3.2 oz)   SpO2 97%   BMI 22.20 kg/m    General: Awake, Alert, oriented x3, sitting up in wheelchair, appropriately, follows simple commands, conversant  HEENT:Pink conjunctiva, moist oral mucosa  NECK: Supple  BACK: No kyphosis of the thoracic spine  EXTREMITIES: moves both upper and lower with limitation to RUE, RUE in sling + CMS, no pedal edema, no calf tenderness. Pt ambulating earlier with cane out in hallway with therapy.   SKIN: Warm and dry  NEUROLOGIC: Intact, pulses palpable  PSYCHIATRIC: Cognitive impairment noted however pt answering questions appropriately.       Labs:  All labs reviewed in the nursing home record and Epic   @  Lab Results   Component Value Date    WBC 7.1 01/20/2025     Lab Results   Component Value Date    RBC 2.98 01/20/2025     Lab Results   Component Value Date    HGB 9.5 01/20/2025     Lab Results   Component Value Date    HCT 28.7 01/20/2025     Lab Results   Component Value Date    MCV 96 01/20/2025     Lab Results   Component Value Date    MCH 31.9 01/20/2025     Lab Results   Component Value Date    MCHC 33.1 01/20/2025     Lab Results   Component Value Date    RDW 12.6 01/20/2025     Lab Results   Component Value Date    PLT 83 01/20/2025        @Last Comprehensive Metabolic Panel:  Sodium   Date Value Ref Range " Status   01/22/2025 141 135 - 145 mmol/L Final     Potassium   Date Value Ref Range Status   01/22/2025 3.9 3.4 - 5.3 mmol/L Final   10/22/2021 3.9 3.5 - 5.0 mmol/L Final     Chloride   Date Value Ref Range Status   01/22/2025 107 98 - 107 mmol/L Final   10/22/2021 110 (H) 98 - 107 mmol/L Final     Carbon Dioxide (CO2)   Date Value Ref Range Status   01/22/2025 25 22 - 29 mmol/L Final   10/22/2021 26 22 - 31 mmol/L Final     Anion Gap   Date Value Ref Range Status   01/22/2025 9 7 - 15 mmol/L Final   10/22/2021 9 5 - 18 mmol/L Final     Glucose   Date Value Ref Range Status   01/22/2025 106 (H) 70 - 99 mg/dL Final   10/22/2021 103 70 - 125 mg/dL Final     Urea Nitrogen   Date Value Ref Range Status   01/22/2025 26.0 (H) 8.0 - 23.0 mg/dL Final   10/22/2021 16 8 - 28 mg/dL Final     Creatinine   Date Value Ref Range Status   01/22/2025 0.86 0.51 - 0.95 mg/dL Final     GFR Estimate   Date Value Ref Range Status   01/22/2025 67 >60 mL/min/1.73m2 Final   08/25/2020 59 (L) >60 mL/min/1.73m2 Final     Calcium   Date Value Ref Range Status   01/22/2025 9.4 8.8 - 10.4 mg/dL Final     Comment:     Reference intervals for this test were updated on 7/16/2024 to reflect our healthy population more accurately. There may be differences in the flagging of prior results with similar values performed with this method. Those prior results can be interpreted in the context of the updated reference intervals.         This note has been dictated using voice recognition software. Any grammatical or context distortions are unintentional and inherent to the software    Electronically signed by: Lizette Carbajal, CNP

## 2025-02-13 ENCOUNTER — TRANSITIONAL CARE UNIT VISIT (OUTPATIENT)
Dept: GERIATRICS | Facility: CLINIC | Age: 85
End: 2025-02-13
Payer: COMMERCIAL

## 2025-02-13 VITALS
RESPIRATION RATE: 14 BRPM | SYSTOLIC BLOOD PRESSURE: 147 MMHG | HEART RATE: 62 BPM | TEMPERATURE: 98.4 F | OXYGEN SATURATION: 97 % | BODY MASS INDEX: 22.29 KG/M2 | WEIGHT: 106.2 LBS | DIASTOLIC BLOOD PRESSURE: 75 MMHG | HEIGHT: 58 IN

## 2025-02-13 DIAGNOSIS — F02.80 ALZHEIMER'S DISEASE (H): Primary | ICD-10-CM

## 2025-02-13 DIAGNOSIS — N32.81 OVERACTIVE BLADDER: ICD-10-CM

## 2025-02-13 DIAGNOSIS — S42.201D CLOSED FRACTURE OF PROXIMAL END OF RIGHT HUMERUS WITH ROUTINE HEALING, UNSPECIFIED FRACTURE MORPHOLOGY, SUBSEQUENT ENCOUNTER: ICD-10-CM

## 2025-02-13 DIAGNOSIS — F33.40 MAJOR DEPRESSIVE DISORDER, RECURRENT, IN REMISSION, UNSPECIFIED: ICD-10-CM

## 2025-02-13 DIAGNOSIS — F41.1 GENERALIZED ANXIETY DISORDER: ICD-10-CM

## 2025-02-13 DIAGNOSIS — F51.01 PRIMARY INSOMNIA: ICD-10-CM

## 2025-02-13 DIAGNOSIS — G30.9 ALZHEIMER'S DISEASE (H): Primary | ICD-10-CM

## 2025-02-13 DIAGNOSIS — D63.8 ANEMIA IN OTHER CHRONIC DISEASES CLASSIFIED ELSEWHERE: ICD-10-CM

## 2025-02-13 NOTE — PROGRESS NOTES
[facility]    Code Status:  FULL CODE   Visit Type:   Chief Complaint   Patient presents with    TCU Follow Up     Facility:  [facility] ([facility]) [[id number]]         HPI: Bushra Mccoy is a 84 year old female I am seeing today for follow up on  the TCU.  Past medical history includes Alzheimer's dementia, overactive bladder and chronic anemia with low platelets. Patient with recent fall sustaining a right humerus fracture.  He was seen by Ortho and splinted.  Pain control with Tylenol.  Imaging done at the ER found incidentally a thyroid nodule.  CT showed right thyroid lobe hypodense nodule.  Suggested follow-up ultrasound.    Transitional Care Course: On today's visit patient is sitting up in wheelchair. Underlying cognitive impairment.Recent right humerus fracture. She continues in sling.Pain controlled with tylenol. Pt denies any dizziness or light headness. BP improved.  Pt reports sleeping well.     Assessment/Plan:     Right humerus neck fracture  -Xrays show an apparent subtle linear lucency within the cortex of the lateral aspect of the right humeral neck and apparent foreshortening of the humeral neck,   -CT shows impacted fracture involving the surgical neck of the proximal right humerus.   -Orthopedics consult patient placed in sling.  Positive CMS.  -Nonweightbearing right upper extremity. Continues in sling.   -Tylenol 650 3 times daily.  -Repeat Follow-up with Ortho in 1 week.      Alzheimer's dementia  Depression with anxiety   Insomnia   -SLUMS 14/30  -Donepezil 23 mg nightly.  -Fluoxetine 40 mg every morning. (This could be contributory to orthostatic hypotension.) Monitor.  -unable to tolerate melatonin. Discontinue.    -pt was using lorazepam at home however discontinued due to falls and orthostatic hypotension.     Overactive bladder  -tolterodine 4 mg Q AM.      Chronic anemia/low platelets   Low-grade B Cell lymphoma  -Had a bone marrow biopsy 2023  -Bone marrow  study shows low-grade B-cell lymphoma  -Follow up CBC with hgb 9.1.   -Follows with  oncology clinic     Thyroid nodule   -Incidentally found on CT.   -Follow-up out pt with PCP for US.       Active Ambulatory Problems     Diagnosis Date Noted    Thyroid nodule 01/19/2025    Fall at home, initial encounter 01/19/2025    Closed fracture of proximal end of right humerus, unspecified fracture morphology, initial encounter 01/19/2025    Drug-induced constipation 01/20/2025    Acute vaginitis 09/26/2023    Generalized anxiety disorder 04/24/2023    Alzheimer's disease (H) 09/26/2023    Overactive bladder 09/26/2023    Diverticular disease of colon 11/07/2012    Diverticular disease of large intestine 12/07/2021    History of colonic polyps 12/09/2021    Major depressive disorder, recurrent, in remission, unspecified 04/24/2023    Polyp of colon 12/07/2021    Seasonal allergies 04/24/2023     Resolved Ambulatory Problems     Diagnosis Date Noted    No Resolved Ambulatory Problems     No Additional Past Medical History     Allergies   Allergen Reactions    Bactrim [Sulfamethoxazole-Trimethoprim] Rash    Imipramine Unknown    Ofloxacin Unknown    Penicillins Unknown    Quinolones Unknown    Sulfa Antibiotics Rash    Tricyclic Antidepressants Unknown       All Meds and Allergies reviewed in the record at the facility and is the most up-to-date.  Current Outpatient Medications   Medication Sig Dispense Refill    acetaminophen (TYLENOL) 325 MG tablet Take 2 tablets (650 mg) by mouth 3 times daily.      atorvastatin (LIPITOR) 20 MG tablet Take 20 mg by mouth every evening.      donepezil (ARICEPT) 23 MG tablet Take 1 tablet by mouth at bedtime.      estradiol (ESTRACE) 0.1 MG/GM vaginal cream Place vaginally three times a week.      FLUoxetine (PROZAC) 40 MG capsule Take 40 mg by mouth daily.      ipratropium (ATROVENT) 0.03 % nasal spray Spray 2 sprays into both nostrils 3 times daily.      multivitamin w/minerals  "(THERA-VIT-M) tablet Take 1 tablet by mouth daily.      senna-docusate (SENOKOT-S/PERICOLACE) 8.6-50 MG tablet Take 1 tablet by mouth 2 times daily as needed for constipation.      tolterodine ER (DETROL LA) 4 MG 24 hr capsule Take 4 mg by mouth daily.       No current facility-administered medications for this visit.       REVIEW OF SYSTEMS:   10 point review of systems reviewed and pertinent positives in the HPI.     PHYSICAL EXAMINATION:  Physical Exam     Vital signs: BP (!) 147/75   Pulse 62   Temp 98.4  F (36.9  C)   Resp 14   Ht 1.473 m (4' 10\")   Wt 48.2 kg (106 lb 3.2 oz)   SpO2 97%   BMI 22.20 kg/m    General: Awake, Alert, oriented x3, sitting up in wheelchair, appropriately, follows simple commands, conversant  HEENT:Pink conjunctiva, moist oral mucosa  NECK: Supple  BACK: No kyphosis of the thoracic spine  EXTREMITIES: moves both upper and lower with limitation to RUE, RUE in sling + CMS, no pedal edema, no calf tenderness. Pt ambulating earlier with cane out in hallway with therapy.   SKIN: Warm and dry  NEUROLOGIC: Intact, pulses palpable  PSYCHIATRIC: Cognitive impairment noted however pt answering questions appropriately.       Labs:  All labs reviewed in the nursing home record and Epic   @  Lab Results   Component Value Date    WBC 7.1 01/20/2025     Lab Results   Component Value Date    RBC 2.98 01/20/2025     Lab Results   Component Value Date    HGB 9.5 01/20/2025     Lab Results   Component Value Date    HCT 28.7 01/20/2025     Lab Results   Component Value Date    MCV 96 01/20/2025     Lab Results   Component Value Date    MCH 31.9 01/20/2025     Lab Results   Component Value Date    MCHC 33.1 01/20/2025     Lab Results   Component Value Date    RDW 12.6 01/20/2025     Lab Results   Component Value Date    PLT 83 01/20/2025        @Last Comprehensive Metabolic Panel:  Sodium   Date Value Ref Range Status   01/22/2025 141 135 - 145 mmol/L Final     Potassium   Date Value Ref Range " Status   01/22/2025 3.9 3.4 - 5.3 mmol/L Final   10/22/2021 3.9 3.5 - 5.0 mmol/L Final     Chloride   Date Value Ref Range Status   01/22/2025 107 98 - 107 mmol/L Final   10/22/2021 110 (H) 98 - 107 mmol/L Final     Carbon Dioxide (CO2)   Date Value Ref Range Status   01/22/2025 25 22 - 29 mmol/L Final   10/22/2021 26 22 - 31 mmol/L Final     Anion Gap   Date Value Ref Range Status   01/22/2025 9 7 - 15 mmol/L Final   10/22/2021 9 5 - 18 mmol/L Final     Glucose   Date Value Ref Range Status   01/22/2025 106 (H) 70 - 99 mg/dL Final   10/22/2021 103 70 - 125 mg/dL Final     Urea Nitrogen   Date Value Ref Range Status   01/22/2025 26.0 (H) 8.0 - 23.0 mg/dL Final   10/22/2021 16 8 - 28 mg/dL Final     Creatinine   Date Value Ref Range Status   01/22/2025 0.86 0.51 - 0.95 mg/dL Final     GFR Estimate   Date Value Ref Range Status   01/22/2025 67 >60 mL/min/1.73m2 Final   08/25/2020 59 (L) >60 mL/min/1.73m2 Final     Calcium   Date Value Ref Range Status   01/22/2025 9.4 8.8 - 10.4 mg/dL Final     Comment:     Reference intervals for this test were updated on 7/16/2024 to reflect our healthy population more accurately. There may be differences in the flagging of prior results with similar values performed with this method. Those prior results can be interpreted in the context of the updated reference intervals.         This note has been dictated using voice recognition software. Any grammatical or context distortions are unintentional and inherent to the software    Electronically signed by: Lizette Carbajal CNP

## 2025-02-13 NOTE — LETTER
2/13/2025      Bushra Mccoy  [address]  [place] [address]        [facility]    Code Status:  FULL CODE   Visit Type:   Chief Complaint   Patient presents with     TCU Follow Up     Facility:  [facility] ([facility]) [[id number]]         HPI: Bushra Mccoy is a 84 year old female I am seeing today for follow up on  the TCU.  Past medical history includes Alzheimer's dementia, overactive bladder and chronic anemia with low platelets. Patient with recent fall sustaining a right humerus fracture.  He was seen by Ortho and splinted.  Pain control with Tylenol.  Imaging done at the ER found incidentally a thyroid nodule.  CT showed right thyroid lobe hypodense nodule.  Suggested follow-up ultrasound.    Transitional Care Course: On today's visit patient is sitting up in wheelchair. Underlying cognitive impairment.Recent right humerus fracture. She continues in sling.Pain controlled with tylenol. Pt denies any dizziness or light headness. BP improved.  Pt reports sleeping well.     Assessment/Plan:     Right humerus neck fracture  -Xrays show an apparent subtle linear lucency within the cortex of the lateral aspect of the right humeral neck and apparent foreshortening of the humeral neck,   -CT shows impacted fracture involving the surgical neck of the proximal right humerus.   -Orthopedics consult patient placed in sling.  Positive CMS.  -Nonweightbearing right upper extremity. Continues in sling.   -Tylenol 650 3 times daily.  -Repeat Follow-up with Ortho in 1 week.      Alzheimer's dementia  Depression with anxiety   Insomnia   -SLUMS 14/30  -Donepezil 23 mg nightly.  -Fluoxetine 40 mg every morning. (This could be contributory to orthostatic hypotension.) Monitor.  -unable to tolerate melatonin. Discontinue.    -pt was using lorazepam at home however discontinued due to falls and orthostatic hypotension.     Overactive bladder  -tolterodine 4 mg Q AM.      Chronic  anemia/low platelets   Low-grade B Cell lymphoma  -Had a bone marrow biopsy 2023  -Bone marrow study shows low-grade B-cell lymphoma  -Follow up CBC with hgb 9.1.   -Follows with  oncology clinic     Thyroid nodule   -Incidentally found on CT.   -Follow-up out pt with PCP for US.       Active Ambulatory Problems     Diagnosis Date Noted     Thyroid nodule 01/19/2025     Fall at home, initial encounter 01/19/2025     Closed fracture of proximal end of right humerus, unspecified fracture morphology, initial encounter 01/19/2025     Drug-induced constipation 01/20/2025     Acute vaginitis 09/26/2023     Generalized anxiety disorder 04/24/2023     Alzheimer's disease (H) 09/26/2023     Overactive bladder 09/26/2023     Diverticular disease of colon 11/07/2012     Diverticular disease of large intestine 12/07/2021     History of colonic polyps 12/09/2021     Major depressive disorder, recurrent, in remission, unspecified 04/24/2023     Polyp of colon 12/07/2021     Seasonal allergies 04/24/2023     Resolved Ambulatory Problems     Diagnosis Date Noted     No Resolved Ambulatory Problems     No Additional Past Medical History     Allergies   Allergen Reactions     Bactrim [Sulfamethoxazole-Trimethoprim] Rash     Imipramine Unknown     Ofloxacin Unknown     Penicillins Unknown     Quinolones Unknown     Sulfa Antibiotics Rash     Tricyclic Antidepressants Unknown       All Meds and Allergies reviewed in the record at the facility and is the most up-to-date.  Current Outpatient Medications   Medication Sig Dispense Refill     acetaminophen (TYLENOL) 325 MG tablet Take 2 tablets (650 mg) by mouth 3 times daily.       atorvastatin (LIPITOR) 20 MG tablet Take 20 mg by mouth every evening.       donepezil (ARICEPT) 23 MG tablet Take 1 tablet by mouth at bedtime.       estradiol (ESTRACE) 0.1 MG/GM vaginal cream Place vaginally three times a week.       FLUoxetine (PROZAC) 40 MG capsule Take 40 mg by mouth daily.        "ipratropium (ATROVENT) 0.03 % nasal spray Spray 2 sprays into both nostrils 3 times daily.       multivitamin w/minerals (THERA-VIT-M) tablet Take 1 tablet by mouth daily.       senna-docusate (SENOKOT-S/PERICOLACE) 8.6-50 MG tablet Take 1 tablet by mouth 2 times daily as needed for constipation.       tolterodine ER (DETROL LA) 4 MG 24 hr capsule Take 4 mg by mouth daily.       No current facility-administered medications for this visit.       REVIEW OF SYSTEMS:   10 point review of systems reviewed and pertinent positives in the HPI.     PHYSICAL EXAMINATION:  Physical Exam     Vital signs: BP (!) 147/75   Pulse 62   Temp 98.4  F (36.9  C)   Resp 14   Ht 1.473 m (4' 10\")   Wt 48.2 kg (106 lb 3.2 oz)   SpO2 97%   BMI 22.20 kg/m    General: Awake, Alert, oriented x3, sitting up in wheelchair, appropriately, follows simple commands, conversant  HEENT:Pink conjunctiva, moist oral mucosa  NECK: Supple  BACK: No kyphosis of the thoracic spine  EXTREMITIES: moves both upper and lower with limitation to RUE, RUE in sling + CMS, no pedal edema, no calf tenderness. Pt ambulating earlier with cane out in hallway with therapy.   SKIN: Warm and dry  NEUROLOGIC: Intact, pulses palpable  PSYCHIATRIC: Cognitive impairment noted however pt answering questions appropriately.       Labs:  All labs reviewed in the nursing home record and Epic   @  Lab Results   Component Value Date    WBC 7.1 01/20/2025     Lab Results   Component Value Date    RBC 2.98 01/20/2025     Lab Results   Component Value Date    HGB 9.5 01/20/2025     Lab Results   Component Value Date    HCT 28.7 01/20/2025     Lab Results   Component Value Date    MCV 96 01/20/2025     Lab Results   Component Value Date    MCH 31.9 01/20/2025     Lab Results   Component Value Date    MCHC 33.1 01/20/2025     Lab Results   Component Value Date    RDW 12.6 01/20/2025     Lab Results   Component Value Date    PLT 83 01/20/2025        @Last Comprehensive Metabolic " Panel:  Sodium   Date Value Ref Range Status   01/22/2025 141 135 - 145 mmol/L Final     Potassium   Date Value Ref Range Status   01/22/2025 3.9 3.4 - 5.3 mmol/L Final   10/22/2021 3.9 3.5 - 5.0 mmol/L Final     Chloride   Date Value Ref Range Status   01/22/2025 107 98 - 107 mmol/L Final   10/22/2021 110 (H) 98 - 107 mmol/L Final     Carbon Dioxide (CO2)   Date Value Ref Range Status   01/22/2025 25 22 - 29 mmol/L Final   10/22/2021 26 22 - 31 mmol/L Final     Anion Gap   Date Value Ref Range Status   01/22/2025 9 7 - 15 mmol/L Final   10/22/2021 9 5 - 18 mmol/L Final     Glucose   Date Value Ref Range Status   01/22/2025 106 (H) 70 - 99 mg/dL Final   10/22/2021 103 70 - 125 mg/dL Final     Urea Nitrogen   Date Value Ref Range Status   01/22/2025 26.0 (H) 8.0 - 23.0 mg/dL Final   10/22/2021 16 8 - 28 mg/dL Final     Creatinine   Date Value Ref Range Status   01/22/2025 0.86 0.51 - 0.95 mg/dL Final     GFR Estimate   Date Value Ref Range Status   01/22/2025 67 >60 mL/min/1.73m2 Final   08/25/2020 59 (L) >60 mL/min/1.73m2 Final     Calcium   Date Value Ref Range Status   01/22/2025 9.4 8.8 - 10.4 mg/dL Final     Comment:     Reference intervals for this test were updated on 7/16/2024 to reflect our healthy population more accurately. There may be differences in the flagging of prior results with similar values performed with this method. Those prior results can be interpreted in the context of the updated reference intervals.         This note has been dictated using voice recognition software. Any grammatical or context distortions are unintentional and inherent to the software    Electronically signed by: Lizette Carbajal CNP       Sincerely,        Lizette Carbajal NP    Electronically signed

## 2025-02-20 ENCOUNTER — TRANSITIONAL CARE UNIT VISIT (OUTPATIENT)
Dept: GERIATRICS | Facility: CLINIC | Age: 85
End: 2025-02-20
Payer: COMMERCIAL

## 2025-02-20 VITALS
WEIGHT: 108 LBS | TEMPERATURE: 97.9 F | DIASTOLIC BLOOD PRESSURE: 73 MMHG | HEART RATE: 56 BPM | OXYGEN SATURATION: 99 % | BODY MASS INDEX: 22.67 KG/M2 | SYSTOLIC BLOOD PRESSURE: 114 MMHG | RESPIRATION RATE: 18 BRPM | HEIGHT: 58 IN

## 2025-02-20 DIAGNOSIS — G30.9 ALZHEIMER'S DISEASE (H): Primary | ICD-10-CM

## 2025-02-20 DIAGNOSIS — F51.01 PRIMARY INSOMNIA: ICD-10-CM

## 2025-02-20 DIAGNOSIS — N32.81 OVERACTIVE BLADDER: ICD-10-CM

## 2025-02-20 DIAGNOSIS — F41.1 GENERALIZED ANXIETY DISORDER: ICD-10-CM

## 2025-02-20 DIAGNOSIS — F33.40 MAJOR DEPRESSIVE DISORDER, RECURRENT, IN REMISSION, UNSPECIFIED: ICD-10-CM

## 2025-02-20 DIAGNOSIS — S42.201D CLOSED FRACTURE OF PROXIMAL END OF RIGHT HUMERUS WITH ROUTINE HEALING, UNSPECIFIED FRACTURE MORPHOLOGY, SUBSEQUENT ENCOUNTER: ICD-10-CM

## 2025-02-20 DIAGNOSIS — E04.1 THYROID NODULE: ICD-10-CM

## 2025-02-20 DIAGNOSIS — F02.80 ALZHEIMER'S DISEASE (H): Primary | ICD-10-CM

## 2025-02-20 DIAGNOSIS — C83.00 SMALL B-CELL LYMPHOMA, UNSPECIFIED BODY REGION (H): ICD-10-CM

## 2025-02-20 DIAGNOSIS — D63.8 ANEMIA IN OTHER CHRONIC DISEASES CLASSIFIED ELSEWHERE: ICD-10-CM

## 2025-02-20 DIAGNOSIS — I95.1 ORTHOSTATIC HYPOTENSION: ICD-10-CM

## 2025-02-20 PROBLEM — D61.818 ACQUIRED PANCYTOPENIA (H): Status: ACTIVE | Noted: 2025-02-20

## 2025-02-20 PROBLEM — D64.9 NORMOCYTIC NORMOCHROMIC ANEMIA: Status: ACTIVE | Noted: 2025-02-20

## 2025-02-20 NOTE — LETTER
2/20/2025      Bushra Mccoy  [address]  [place] [address]        [facility]    Code Status:  FULL CODE   Visit Type:   Chief Complaint   Patient presents with     TCU Follow Up     Facility:  [facility] [place] ([facility]) [[id number]]         HPI: Bushra Mccoy is a 84 year old female I am seeing today for follow up on  the TCU.  Past medical history includes Alzheimer's dementia, overactive bladder and chronic anemia with low platelets. Patient with recent fall sustaining a right humerus fracture.  He was seen by Ortho and splinted.  Pain control with Tylenol.  Imaging done at the ER found incidentally a thyroid nodule.  CT showed right thyroid lobe hypodense nodule.  Suggested follow-up ultrasound.    Transitional Care Course: On today's visit patient is sitting up in wheelchair. Underlying cognitive impairment however answering all questions appropriately. Recent right humerus fracture. She continues in sling.Pain controlled with tylenol. Continues with weight bearing restrictions to RUE. Pt denies any dizziness or light headness. BP satisfactory. Lorazepam discontinued due to falls. Pt has prn melatonin.  Pt sleeping well.     Assessment/Plan:     Right humerus neck fracture  -Xrays show an apparent subtle linear lucency within the cortex of the lateral aspect of the right humeral neck and apparent foreshortening of the humeral neck,   -CT shows impacted fracture involving the surgical neck of the proximal right humerus.   -Orthopedics consult patient placed in sling.  Positive CMS.  -Nonweightbearing right upper extremity. Continues in sling.   -Tylenol 650 3 times daily.  -Repeat Follow-up with Ortho in 1 week.      Alzheimer's dementia  Depression with anxiety   Insomnia   -SLUMS 14/30  -Donepezil 23 mg nightly.  -Fluoxetine 40 mg every morning. (This could be contributory to orthostatic hypotension.) Monitor.  -unable to tolerate melatonin. Discontinue.    -pt was  using lorazepam at home however discontinued due to falls and orthostatic hypotension.     Overactive bladder  -tolterodine 4 mg Q AM.      Chronic anemia/low platelets   Low-grade B Cell lymphoma  -Had a bone marrow biopsy 2023  -Bone marrow study shows low-grade B-cell lymphoma  -Follow up CBC with hgb 9.1.   -Follows with  oncology clinic. Follow up out pt.      Thyroid nodule   -Incidentally found on CT.   -Follow-up out pt with PCP for US.     -ok to discharge with current meds and treatments.   -Home PT, OT, HHA and RN for medication management.   -Follow up with PCP in 1-2 weeks.       Active Ambulatory Problems     Diagnosis Date Noted     Thyroid nodule 01/19/2025     Fall at home, initial encounter 01/19/2025     Closed fracture of proximal end of right humerus, unspecified fracture morphology, initial encounter 01/19/2025     Drug-induced constipation 01/20/2025     Acute vaginitis 09/26/2023     Generalized anxiety disorder 04/24/2023     Alzheimer's disease (H) 09/26/2023     Overactive bladder 09/26/2023     Diverticular disease of colon 11/07/2012     Diverticular disease of large intestine 12/07/2021     History of colonic polyps 12/09/2021     Major depressive disorder, recurrent, in remission, unspecified 04/24/2023     Polyp of colon 12/07/2021     Seasonal allergies 04/24/2023     Normocytic normochromic anemia 02/20/2025     Acquired pancytopenia (H) 02/20/2025     Resolved Ambulatory Problems     Diagnosis Date Noted     No Resolved Ambulatory Problems     No Additional Past Medical History     Allergies   Allergen Reactions     Bactrim [Sulfamethoxazole-Trimethoprim] Rash     Imipramine Unknown     Ofloxacin Unknown     Penicillins Unknown     Quinolones Unknown     Sulfa Antibiotics Rash     Tricyclic Antidepressants Unknown       All Meds and Allergies reviewed in the record at the facility and is the most up-to-date.  Current Outpatient Medications   Medication Sig Dispense Refill      "acetaminophen (TYLENOL) 325 MG tablet Take 2 tablets (650 mg) by mouth 3 times daily.       atorvastatin (LIPITOR) 20 MG tablet Take 20 mg by mouth every evening.       donepezil (ARICEPT) 23 MG tablet Take 1 tablet by mouth at bedtime.       estradiol (ESTRACE) 0.1 MG/GM vaginal cream Place vaginally three times a week.       FLUoxetine (PROZAC) 40 MG capsule Take 40 mg by mouth daily.       ipratropium (ATROVENT) 0.03 % nasal spray Spray 2 sprays into both nostrils 3 times daily.       multivitamin w/minerals (THERA-VIT-M) tablet Take 1 tablet by mouth daily.       senna-docusate (SENOKOT-S/PERICOLACE) 8.6-50 MG tablet Take 1 tablet by mouth 2 times daily as needed for constipation.       tolterodine ER (DETROL LA) 4 MG 24 hr capsule Take 4 mg by mouth daily.       No current facility-administered medications for this visit.       REVIEW OF SYSTEMS:   10 point review of systems reviewed and pertinent positives in the HPI.     PHYSICAL EXAMINATION:  Physical Exam     Vital signs: /73   Pulse 56   Temp 97.9  F (36.6  C)   Resp 18   Ht 1.473 m (4' 10\")   Wt 49 kg (108 lb)   SpO2 99%   BMI 22.57 kg/m    General: Awake, Alert, oriented x3, sitting up in wheelchair, appropriately, follows simple commands, conversant  HEENT:Pink conjunctiva, moist oral mucosa  NECK: Supple  BACK: No kyphosis of the thoracic spine  EXTREMITIES: moves both upper and lower with limitation to RUE, RUE in sling + CMS, no pedal edema, no calf tenderness.   SKIN: Warm and dry  NEUROLOGIC: Intact, pulses palpable  PSYCHIATRIC: Cognitive impairment noted however pt answering questions appropriately.       Labs:  All labs reviewed in the nursing home record and Epic   @  Lab Results   Component Value Date    WBC 7.1 01/20/2025     Lab Results   Component Value Date    RBC 2.98 01/20/2025     Lab Results   Component Value Date    HGB 9.5 01/20/2025     Lab Results   Component Value Date    HCT 28.7 01/20/2025     Lab Results "   Component Value Date    MCV 96 01/20/2025     Lab Results   Component Value Date    MCH 31.9 01/20/2025     Lab Results   Component Value Date    MCHC 33.1 01/20/2025     Lab Results   Component Value Date    RDW 12.6 01/20/2025     Lab Results   Component Value Date    PLT 83 01/20/2025        @Last Comprehensive Metabolic Panel:  Sodium   Date Value Ref Range Status   01/22/2025 141 135 - 145 mmol/L Final     Potassium   Date Value Ref Range Status   01/22/2025 3.9 3.4 - 5.3 mmol/L Final   10/22/2021 3.9 3.5 - 5.0 mmol/L Final     Chloride   Date Value Ref Range Status   01/22/2025 107 98 - 107 mmol/L Final   10/22/2021 110 (H) 98 - 107 mmol/L Final     Carbon Dioxide (CO2)   Date Value Ref Range Status   01/22/2025 25 22 - 29 mmol/L Final   10/22/2021 26 22 - 31 mmol/L Final     Anion Gap   Date Value Ref Range Status   01/22/2025 9 7 - 15 mmol/L Final   10/22/2021 9 5 - 18 mmol/L Final     Glucose   Date Value Ref Range Status   01/22/2025 106 (H) 70 - 99 mg/dL Final   10/22/2021 103 70 - 125 mg/dL Final     Urea Nitrogen   Date Value Ref Range Status   01/22/2025 26.0 (H) 8.0 - 23.0 mg/dL Final   10/22/2021 16 8 - 28 mg/dL Final     Creatinine   Date Value Ref Range Status   01/22/2025 0.86 0.51 - 0.95 mg/dL Final     GFR Estimate   Date Value Ref Range Status   01/22/2025 67 >60 mL/min/1.73m2 Final   08/25/2020 59 (L) >60 mL/min/1.73m2 Final     Calcium   Date Value Ref Range Status   01/22/2025 9.4 8.8 - 10.4 mg/dL Final     Comment:     Reference intervals for this test were updated on 7/16/2024 to reflect our healthy population more accurately. There may be differences in the flagging of prior results with similar values performed with this method. Those prior results can be interpreted in the context of the updated reference intervals.     DISCHARGE PLAN/FACE TO FACE:  I certify that this patient is under my care and that I, or a nurse practitioner or physician's assistant working with me, had a  face-to-face encounter that meets the physician face-to-face encounter requirements with this patient.       I certify that, based on my findings, the following services are medically necessary home health services.    My clinical findings support the need for the above skilled services.    This patient is homebound because: Recent fall with right humerus fracture.     The patient is, or has been, under my care and I have initiated the establishment of the plan of care. This patient will be followed by a physician who will periodically review the plan of care.    > 35 minutes spent for this visit which included reviewing discharge medications, home care services and follow ups as well as collaborating with nursing.     This note has been dictated using voice recognition software. Any grammatical or context distortions are unintentional and inherent to the software    Electronically signed by: Lizette Carbajal CNP       Sincerely,        Lizette Carbajal NP    Electronically signed

## 2025-02-20 NOTE — PROGRESS NOTES
[facility] GERIATRIC SERVICES    Code Status:  FULL CODE   Visit Type:   Chief Complaint   Patient presents with    TCU Follow Up     Facility:  [facility] ([facility]) [[id number]]         HPI: Bushra Mccoy is a 84 year old female I am seeing today for follow up on  the TCU.  Past medical history includes Alzheimer's dementia, overactive bladder and chronic anemia with low platelets. Patient with recent fall sustaining a right humerus fracture.  He was seen by Ortho and splinted.  Pain control with Tylenol.  Imaging done at the ER found incidentally a thyroid nodule.  CT showed right thyroid lobe hypodense nodule.  Suggested follow-up ultrasound.    Transitional Care Course: On today's visit patient is sitting up in wheelchair. Underlying cognitive impairment however answering all questions appropriately. Recent right humerus fracture. She continues in sling.Pain controlled with tylenol. Continues with weight bearing restrictions to RUE. Pt denies any dizziness or light headness. BP satisfactory. Lorazepam discontinued due to falls. Pt has prn melatonin.  Pt sleeping well.     Assessment/Plan:     Right humerus neck fracture  -Xrays show an apparent subtle linear lucency within the cortex of the lateral aspect of the right humeral neck and apparent foreshortening of the humeral neck,   -CT shows impacted fracture involving the surgical neck of the proximal right humerus.   -Orthopedics consult patient placed in sling.  Positive CMS.  -Nonweightbearing right upper extremity. Continues in sling.   -Tylenol 650 3 times daily.  -Repeat Follow-up with Ortho in 1 week.      Alzheimer's dementia  Depression with anxiety   Insomnia   -SLUMS 14/30  -Donepezil 23 mg nightly.  -Fluoxetine 40 mg every morning. (This could be contributory to orthostatic hypotension.) Monitor.  -unable to tolerate melatonin. Discontinue.    -pt was using lorazepam at home however discontinued due to falls and orthostatic hypotension.      Overactive bladder  -tolterodine 4 mg Q AM.      Chronic anemia/low platelets   Low-grade B Cell lymphoma  -Had a bone marrow biopsy 2023  -Bone marrow study shows low-grade B-cell lymphoma  -Follow up CBC with hgb 9.1.   -Follows with  oncology clinic. Follow up out pt.      Thyroid nodule   -Incidentally found on CT.   -Follow-up out pt with PCP for US.     -ok to discharge with current meds and treatments.   -Home PT, OT, HHA and RN for medication management.   -Follow up with PCP in 1-2 weeks.       Active Ambulatory Problems     Diagnosis Date Noted    Thyroid nodule 01/19/2025    Fall at home, initial encounter 01/19/2025    Closed fracture of proximal end of right humerus, unspecified fracture morphology, initial encounter 01/19/2025    Drug-induced constipation 01/20/2025    Acute vaginitis 09/26/2023    Generalized anxiety disorder 04/24/2023    Alzheimer's disease (H) 09/26/2023    Overactive bladder 09/26/2023    Diverticular disease of colon 11/07/2012    Diverticular disease of large intestine 12/07/2021    History of colonic polyps 12/09/2021    Major depressive disorder, recurrent, in remission, unspecified 04/24/2023    Polyp of colon 12/07/2021    Seasonal allergies 04/24/2023    Normocytic normochromic anemia 02/20/2025    Acquired pancytopenia (H) 02/20/2025     Resolved Ambulatory Problems     Diagnosis Date Noted    No Resolved Ambulatory Problems     No Additional Past Medical History     Allergies   Allergen Reactions    Bactrim [Sulfamethoxazole-Trimethoprim] Rash    Imipramine Unknown    Ofloxacin Unknown    Penicillins Unknown    Quinolones Unknown    Sulfa Antibiotics Rash    Tricyclic Antidepressants Unknown       All Meds and Allergies reviewed in the record at the facility and is the most up-to-date.  Current Outpatient Medications   Medication Sig Dispense Refill    acetaminophen (TYLENOL) 325 MG tablet Take 2 tablets (650 mg) by mouth 3 times daily.      atorvastatin (LIPITOR) 20  "MG tablet Take 20 mg by mouth every evening.      donepezil (ARICEPT) 23 MG tablet Take 1 tablet by mouth at bedtime.      estradiol (ESTRACE) 0.1 MG/GM vaginal cream Place vaginally three times a week.      FLUoxetine (PROZAC) 40 MG capsule Take 40 mg by mouth daily.      ipratropium (ATROVENT) 0.03 % nasal spray Spray 2 sprays into both nostrils 3 times daily.      multivitamin w/minerals (THERA-VIT-M) tablet Take 1 tablet by mouth daily.      senna-docusate (SENOKOT-S/PERICOLACE) 8.6-50 MG tablet Take 1 tablet by mouth 2 times daily as needed for constipation.      tolterodine ER (DETROL LA) 4 MG 24 hr capsule Take 4 mg by mouth daily.       No current facility-administered medications for this visit.       REVIEW OF SYSTEMS:   10 point review of systems reviewed and pertinent positives in the HPI.     PHYSICAL EXAMINATION:  Physical Exam     Vital signs: /73   Pulse 56   Temp 97.9  F (36.6  C)   Resp 18   Ht 1.473 m (4' 10\")   Wt 49 kg (108 lb)   SpO2 99%   BMI 22.57 kg/m    General: Awake, Alert, oriented x3, sitting up in wheelchair, appropriately, follows simple commands, conversant  HEENT:Pink conjunctiva, moist oral mucosa  NECK: Supple  BACK: No kyphosis of the thoracic spine  EXTREMITIES: moves both upper and lower with limitation to RUE, RUE in sling + CMS, no pedal edema, no calf tenderness.   SKIN: Warm and dry  NEUROLOGIC: Intact, pulses palpable  PSYCHIATRIC: Cognitive impairment noted however pt answering questions appropriately.       Labs:  All labs reviewed in the nursing home record and Epic   @  Lab Results   Component Value Date    WBC 7.1 01/20/2025     Lab Results   Component Value Date    RBC 2.98 01/20/2025     Lab Results   Component Value Date    HGB 9.5 01/20/2025     Lab Results   Component Value Date    HCT 28.7 01/20/2025     Lab Results   Component Value Date    MCV 96 01/20/2025     Lab Results   Component Value Date    MCH 31.9 01/20/2025     Lab Results   Component " Value Date    MCHC 33.1 01/20/2025     Lab Results   Component Value Date    RDW 12.6 01/20/2025     Lab Results   Component Value Date    PLT 83 01/20/2025        @Last Comprehensive Metabolic Panel:  Sodium   Date Value Ref Range Status   01/22/2025 141 135 - 145 mmol/L Final     Potassium   Date Value Ref Range Status   01/22/2025 3.9 3.4 - 5.3 mmol/L Final   10/22/2021 3.9 3.5 - 5.0 mmol/L Final     Chloride   Date Value Ref Range Status   01/22/2025 107 98 - 107 mmol/L Final   10/22/2021 110 (H) 98 - 107 mmol/L Final     Carbon Dioxide (CO2)   Date Value Ref Range Status   01/22/2025 25 22 - 29 mmol/L Final   10/22/2021 26 22 - 31 mmol/L Final     Anion Gap   Date Value Ref Range Status   01/22/2025 9 7 - 15 mmol/L Final   10/22/2021 9 5 - 18 mmol/L Final     Glucose   Date Value Ref Range Status   01/22/2025 106 (H) 70 - 99 mg/dL Final   10/22/2021 103 70 - 125 mg/dL Final     Urea Nitrogen   Date Value Ref Range Status   01/22/2025 26.0 (H) 8.0 - 23.0 mg/dL Final   10/22/2021 16 8 - 28 mg/dL Final     Creatinine   Date Value Ref Range Status   01/22/2025 0.86 0.51 - 0.95 mg/dL Final     GFR Estimate   Date Value Ref Range Status   01/22/2025 67 >60 mL/min/1.73m2 Final   08/25/2020 59 (L) >60 mL/min/1.73m2 Final     Calcium   Date Value Ref Range Status   01/22/2025 9.4 8.8 - 10.4 mg/dL Final     Comment:     Reference intervals for this test were updated on 7/16/2024 to reflect our healthy population more accurately. There may be differences in the flagging of prior results with similar values performed with this method. Those prior results can be interpreted in the context of the updated reference intervals.     DISCHARGE PLAN/FACE TO FACE:  I certify that this patient is under my care and that I, or a nurse practitioner or physician's assistant working with me, had a face-to-face encounter that meets the physician face-to-face encounter requirements with this patient.       I certify that, based on my  findings, the following services are medically necessary home health services.    My clinical findings support the need for the above skilled services.    This patient is homebound because: Recent fall with right humerus fracture.     The patient is, or has been, under my care and I have initiated the establishment of the plan of care. This patient will be followed by a physician who will periodically review the plan of care.    > 35 minutes spent for this visit which included reviewing discharge medications, home care services and follow ups as well as collaborating with nursing.     This note has been dictated using voice recognition software. Any grammatical or context distortions are unintentional and inherent to the software    Electronically signed by: Lizette Carbajal CNP

## 2025-03-11 PROCEDURE — 81001 URINALYSIS AUTO W/SCOPE: CPT | Mod: ORL | Performed by: PHYSICIAN ASSISTANT

## 2025-03-11 PROCEDURE — 87086 URINE CULTURE/COLONY COUNT: CPT | Mod: ORL | Performed by: PHYSICIAN ASSISTANT

## 2025-03-12 ENCOUNTER — LAB REQUISITION (OUTPATIENT)
Dept: LAB | Facility: CLINIC | Age: 85
End: 2025-03-12
Payer: COMMERCIAL

## 2025-03-12 DIAGNOSIS — R35.0 FREQUENCY OF MICTURITION: ICD-10-CM

## 2025-03-12 LAB
ALBUMIN UR-MCNC: 10 MG/DL
APPEARANCE UR: CLEAR
BILIRUB UR QL STRIP: NEGATIVE
CAOX CRY #/AREA URNS HPF: ABNORMAL /HPF
COLOR UR AUTO: YELLOW
GLUCOSE UR STRIP-MCNC: NEGATIVE MG/DL
HGB UR QL STRIP: NEGATIVE
KETONES UR STRIP-MCNC: NEGATIVE MG/DL
LEUKOCYTE ESTERASE UR QL STRIP: NEGATIVE
MUCOUS THREADS #/AREA URNS LPF: PRESENT /LPF
NITRATE UR QL: NEGATIVE
PH UR STRIP: 6 [PH] (ref 5–7)
RBC URINE: 0 /HPF
SP GR UR STRIP: 1.03 (ref 1–1.03)
SQUAMOUS EPITHELIAL: 2 /HPF
UROBILINOGEN UR STRIP-MCNC: NORMAL MG/DL
WBC URINE: 0 /HPF

## 2025-03-13 LAB — BACTERIA UR CULT: NORMAL

## 2025-07-24 ENCOUNTER — TRANSCRIBE ORDERS (OUTPATIENT)
Dept: OTHER | Age: 85
End: 2025-07-24

## 2025-07-24 DIAGNOSIS — G25.2 RESTING TREMOR: Primary | ICD-10-CM

## 2025-08-28 ENCOUNTER — LAB REQUISITION (OUTPATIENT)
Dept: LAB | Facility: CLINIC | Age: 85
End: 2025-08-28
Payer: COMMERCIAL

## 2025-08-28 DIAGNOSIS — F02.B0 DEMENTIA IN OTHER DISEASES CLASSIFIED ELSEWHERE, MODERATE, WITHOUT BEHAVIORAL DISTURBANCE, PSYCHOTIC DISTURBANCE, MOOD DISTURBANCE, AND ANXIETY (H): ICD-10-CM

## 2025-08-28 DIAGNOSIS — R26.81 UNSTEADINESS ON FEET: ICD-10-CM

## 2025-08-28 DIAGNOSIS — F33.1 MAJOR DEPRESSIVE DISORDER, RECURRENT, MODERATE (H): ICD-10-CM

## 2025-09-03 LAB
ANION GAP SERPL CALCULATED.3IONS-SCNC: 9 MMOL/L (ref 7–15)
BUN SERPL-MCNC: 16.6 MG/DL (ref 8–23)
CALCIUM SERPL-MCNC: 10 MG/DL (ref 8.8–10.4)
CHLORIDE SERPL-SCNC: 107 MMOL/L (ref 98–107)
CREAT SERPL-MCNC: 0.95 MG/DL (ref 0.51–0.95)
EGFRCR SERPLBLD CKD-EPI 2021: 59 ML/MIN/1.73M2
ERYTHROCYTE [DISTWIDTH] IN BLOOD BY AUTOMATED COUNT: 12.8 % (ref 10–15)
GLUCOSE SERPL-MCNC: 121 MG/DL (ref 70–99)
HCO3 SERPL-SCNC: 28 MMOL/L (ref 22–29)
HCT VFR BLD AUTO: 33.1 % (ref 35–47)
HGB BLD-MCNC: 10.8 G/DL (ref 11.7–15.7)
MCH RBC QN AUTO: 32.1 PG (ref 26.5–33)
MCHC RBC AUTO-ENTMCNC: 32.6 G/DL (ref 31.5–36.5)
MCV RBC AUTO: 98.5 FL (ref 78–100)
PLATELET # BLD AUTO: 103 10E3/UL (ref 150–450)
POTASSIUM SERPL-SCNC: 3.9 MMOL/L (ref 3.4–5.3)
RBC # BLD AUTO: 3.36 10E6/UL (ref 3.8–5.2)
SODIUM SERPL-SCNC: 144 MMOL/L (ref 135–145)
TSH SERPL DL<=0.005 MIU/L-ACNC: 1.52 UIU/ML (ref 0.3–4.2)
WBC # BLD AUTO: 5.65 10E3/UL (ref 4–11)